# Patient Record
Sex: FEMALE | Race: WHITE | NOT HISPANIC OR LATINO | Employment: FULL TIME | URBAN - METROPOLITAN AREA
[De-identification: names, ages, dates, MRNs, and addresses within clinical notes are randomized per-mention and may not be internally consistent; named-entity substitution may affect disease eponyms.]

---

## 2017-03-28 ENCOUNTER — ALLSCRIPTS OFFICE VISIT (OUTPATIENT)
Dept: OTHER | Facility: OTHER | Age: 27
End: 2017-03-28

## 2017-03-28 ENCOUNTER — LAB REQUISITION (OUTPATIENT)
Dept: LAB | Facility: HOSPITAL | Age: 27
End: 2017-03-28
Payer: COMMERCIAL

## 2017-03-28 DIAGNOSIS — R10.2 PELVIC AND PERINEAL PAIN: ICD-10-CM

## 2017-03-28 DIAGNOSIS — Z01.419 ENCOUNTER FOR GYNECOLOGICAL EXAMINATION WITHOUT ABNORMAL FINDING: ICD-10-CM

## 2017-03-28 PROCEDURE — G0145 SCR C/V CYTO,THINLAYER,RESCR: HCPCS | Performed by: NURSE PRACTITIONER

## 2017-04-03 ENCOUNTER — HOSPITAL ENCOUNTER (OUTPATIENT)
Dept: ULTRASOUND IMAGING | Facility: HOSPITAL | Age: 27
Discharge: HOME | End: 2017-04-03
Payer: COMMERCIAL

## 2017-04-03 DIAGNOSIS — R10.2 PELVIC AND PERINEAL PAIN: ICD-10-CM

## 2017-04-03 LAB
LAB AP GYN PRIMARY INTERPRETATION: NORMAL
LAB AP LMP: NORMAL
Lab: NORMAL

## 2017-04-03 PROCEDURE — 76856 US EXAM PELVIC COMPLETE: CPT

## 2017-04-03 PROCEDURE — 76830 TRANSVAGINAL US NON-OB: CPT

## 2017-04-12 ENCOUNTER — GENERIC CONVERSION - ENCOUNTER (OUTPATIENT)
Dept: OTHER | Facility: OTHER | Age: 27
End: 2017-04-12

## 2017-08-24 ENCOUNTER — TRANSCRIBE ORDERS (OUTPATIENT)
Dept: ADMINISTRATIVE | Facility: HOSPITAL | Age: 27
End: 2017-08-24

## 2017-08-24 DIAGNOSIS — J32.9 UNSPECIFIED SINUSITIS (CHRONIC): Primary | ICD-10-CM

## 2017-08-30 ENCOUNTER — HOSPITAL ENCOUNTER (OUTPATIENT)
Dept: CT IMAGING | Facility: HOSPITAL | Age: 27
Discharge: HOME | End: 2017-08-30
Payer: COMMERCIAL

## 2017-08-30 DIAGNOSIS — J32.9 UNSPECIFIED SINUSITIS (CHRONIC): ICD-10-CM

## 2017-08-30 PROCEDURE — 70486 CT MAXILLOFACIAL W/O DYE: CPT

## 2018-01-13 VITALS
DIASTOLIC BLOOD PRESSURE: 78 MMHG | BODY MASS INDEX: 34.45 KG/M2 | WEIGHT: 214.38 LBS | HEIGHT: 66 IN | SYSTOLIC BLOOD PRESSURE: 140 MMHG

## 2018-01-15 NOTE — RESULT NOTES
Verified Results  * US PELVIS COMPLETE (TRANSABDOMINAL AND TRANSVAGINAL) [id number] 22:82OV Rakel Trivedi Order Number: [id number]    - Patient Instructions: To schedule this appointment, please contact Central Scheduling at [phone]  Test Name Result Flag Reference   US PELVIS COMPLETE (TRANSABDOMINAL AND TRANSVAGINAL) (Report)     PELVIC ULTRASOUND, COMPLETE     INDICATION: Pelvic pain          COMPARISON: None  TECHNIQUE:  Transabdominal pelvic ultrasound was performed in sagittal and transverse planes with a curvilinear transducer  Additional transvaginal imaging was performed to better evaluate the endometrium and ovaries  Imaging included volumetric    sweeps as well as traditional still imaging technique  FINDINGS:     UTERUS:   The uterus is anteverted in position, measuring 7 8 x 3 4 x 5 4 cm  Contour and echotexture appear normal      The cervix shows no suspicious abnormality  ENDOMETRIUM:    Normal caliber of 5 4 mm  Homogenous and normal in appearance  OVARIES/ADNEXA:   Right ovary: 3 4 x 2 2 x 2 3 cm  No suspicious right ovarian abnormality  Doppler flow within normal limits  Left ovary: Not visualized  No suspicious adnexal mass or loculated collections     Small amount of free fluid in the posterior cul-de-sac is seen       IMPRESSION:      No pelvic adnexal mass seen   Normal size right ovary   The left ovary is not visualized however there is no mass seen within the left adnexa          Workstation performed: [id number]     Signed by:   Harshal Esparza MD   4/3/17     (1) THIN PREP PAP WITH IMAGING [id number] 23:23CH Rakel Barriosleandro Order Number: [id number]     Test Name Result Flag Reference   LAB AP CASE REPORT (Report)     Gynecologic Cytology Report            Case: [id number]                  Authorizing Provider: SHELDON Moseley    Collected:      03/28/2017 1604        First Screen:     ANDRY Huang   Received: 03/30/2017 1549        Specimen:  LIQUID-BASED PAP, SCREENING, Cervix   LAB AP GYN PRIMARY INTERPRETATION      Negative for intraepithelial lesion or malignancy  Electronically signed by ANDRY Taylor on 4/3/2017 at 3:53 PM   LAB AP GYN SPECIMEN ADEQUACY      Satisfactory for evaluation  Endocervical/transformation zone component present  LAB AP GYN ADDITIONAL INFORMATION (Report)     [employer]'s FDA approved ,  and ThinPrep Imaging System are   utilized with strict adherence to the 's instruction manual to   prepare gynecologic and non-gynecologic cytology specimens for the   production of ThinPrep slides as well as for gynecologic ThinPrep imaging  These processes have been validated by our laboratory and/or by the     The Pap test is not a diagnostic procedure and should not be used as the   sole means to detect cervical cancer  It is only a screening procedure to   aid in the detection of cervical cancer and its precursors  Both   false-negative and false-positive results have been experienced  Your   patient's test result should be interpreted in this context together with   the history and clinical findings     LAB AP LMP 3/8/2017     Performing Comments: CERVICAL - ENDOCERVICAL  ROUTINE

## 2018-02-22 RX ORDER — EPINEPHRINE 0.3 MG/.3ML
0.3 INJECTION SUBCUTANEOUS ONCE
COMMUNITY

## 2018-02-27 ENCOUNTER — ANESTHESIA EVENT (OUTPATIENT)
Dept: PERIOP | Facility: HOSPITAL | Age: 28
End: 2018-02-27
Payer: COMMERCIAL

## 2018-02-28 ENCOUNTER — HOSPITAL ENCOUNTER (OUTPATIENT)
Facility: HOSPITAL | Age: 28
Setting detail: OUTPATIENT SURGERY
Discharge: HOME | End: 2018-02-28
Attending: OTOLARYNGOLOGY | Admitting: OTOLARYNGOLOGY
Payer: COMMERCIAL

## 2018-02-28 ENCOUNTER — ANESTHESIA (OUTPATIENT)
Dept: PERIOP | Facility: HOSPITAL | Age: 28
End: 2018-02-28
Payer: COMMERCIAL

## 2018-02-28 VITALS
OXYGEN SATURATION: 97 % | DIASTOLIC BLOOD PRESSURE: 88 MMHG | TEMPERATURE: 99.6 F | RESPIRATION RATE: 18 BRPM | HEART RATE: 89 BPM | SYSTOLIC BLOOD PRESSURE: 139 MMHG

## 2018-02-28 DIAGNOSIS — J32.9 CHRONIC SINUSITIS: ICD-10-CM

## 2018-02-28 DIAGNOSIS — J34.3 HYPERTROPHY OF NASAL TURBINATES: ICD-10-CM

## 2018-02-28 DIAGNOSIS — J34.2 DEVIATED NASAL SEPTUM: ICD-10-CM

## 2018-02-28 LAB — EXT PREGNANCY TEST URINE: NEGATIVE

## 2018-02-28 PROCEDURE — 88307 TISSUE EXAM BY PATHOLOGIST: CPT | Performed by: OTOLARYNGOLOGY

## 2018-02-28 PROCEDURE — 88304 TISSUE EXAM BY PATHOLOGIST: CPT | Performed by: PATHOLOGY

## 2018-02-28 PROCEDURE — 88304 TISSUE EXAM BY PATHOLOGIST: CPT | Performed by: OTOLARYNGOLOGY

## 2018-02-28 PROCEDURE — 88311 DECALCIFY TISSUE: CPT | Performed by: OTOLARYNGOLOGY

## 2018-02-28 PROCEDURE — 88311 DECALCIFY TISSUE: CPT | Performed by: PATHOLOGY

## 2018-02-28 PROCEDURE — 81025 URINE PREGNANCY TEST: CPT | Performed by: ANESTHESIOLOGY

## 2018-02-28 PROCEDURE — 88307 TISSUE EXAM BY PATHOLOGIST: CPT | Performed by: PATHOLOGY

## 2018-02-28 RX ORDER — OXYCODONE HYDROCHLORIDE AND ACETAMINOPHEN 5; 325 MG/1; MG/1
2 TABLET ORAL EVERY 4 HOURS PRN
Status: DISCONTINUED | OUTPATIENT
Start: 2018-02-28 | End: 2018-02-28 | Stop reason: HOSPADM

## 2018-02-28 RX ORDER — PROPOFOL 10 MG/ML
INJECTION, EMULSION INTRAVENOUS CONTINUOUS PRN
Status: DISCONTINUED | OUTPATIENT
Start: 2018-02-28 | End: 2018-02-28 | Stop reason: SURG

## 2018-02-28 RX ORDER — SODIUM CHLORIDE 9 MG/ML
125 INJECTION, SOLUTION INTRAVENOUS CONTINUOUS
Status: DISCONTINUED | OUTPATIENT
Start: 2018-02-28 | End: 2018-02-28 | Stop reason: HOSPADM

## 2018-02-28 RX ORDER — LIDOCAINE HYDROCHLORIDE AND EPINEPHRINE 10; 10 MG/ML; UG/ML
INJECTION, SOLUTION INFILTRATION; PERINEURAL AS NEEDED
Status: DISCONTINUED | OUTPATIENT
Start: 2018-02-28 | End: 2018-02-28 | Stop reason: HOSPADM

## 2018-02-28 RX ORDER — FENTANYL CITRATE/PF 50 MCG/ML
25 SYRINGE (ML) INJECTION
Status: DISCONTINUED | OUTPATIENT
Start: 2018-02-28 | End: 2018-02-28 | Stop reason: HOSPADM

## 2018-02-28 RX ORDER — ONDANSETRON 2 MG/ML
4 INJECTION INTRAMUSCULAR; INTRAVENOUS ONCE
Status: DISCONTINUED | OUTPATIENT
Start: 2018-02-28 | End: 2018-02-28 | Stop reason: HOSPADM

## 2018-02-28 RX ORDER — MAGNESIUM HYDROXIDE 1200 MG/15ML
LIQUID ORAL AS NEEDED
Status: DISCONTINUED | OUTPATIENT
Start: 2018-02-28 | End: 2018-02-28 | Stop reason: HOSPADM

## 2018-02-28 RX ORDER — ROCURONIUM BROMIDE 10 MG/ML
INJECTION, SOLUTION INTRAVENOUS AS NEEDED
Status: DISCONTINUED | OUTPATIENT
Start: 2018-02-28 | End: 2018-02-28 | Stop reason: SURG

## 2018-02-28 RX ORDER — DEXAMETHASONE SODIUM PHOSPHATE 4 MG/ML
INJECTION, SOLUTION INTRA-ARTICULAR; INTRALESIONAL; INTRAMUSCULAR; INTRAVENOUS; SOFT TISSUE AS NEEDED
Status: DISCONTINUED | OUTPATIENT
Start: 2018-02-28 | End: 2018-02-28 | Stop reason: SURG

## 2018-02-28 RX ORDER — FENTANYL CITRATE 50 UG/ML
INJECTION, SOLUTION INTRAMUSCULAR; INTRAVENOUS AS NEEDED
Status: DISCONTINUED | OUTPATIENT
Start: 2018-02-28 | End: 2018-02-28 | Stop reason: SURG

## 2018-02-28 RX ORDER — PROPOFOL 10 MG/ML
INJECTION, EMULSION INTRAVENOUS AS NEEDED
Status: DISCONTINUED | OUTPATIENT
Start: 2018-02-28 | End: 2018-02-28 | Stop reason: SURG

## 2018-02-28 RX ORDER — ONDANSETRON 2 MG/ML
4 INJECTION INTRAMUSCULAR; INTRAVENOUS EVERY 6 HOURS PRN
Status: DISCONTINUED | OUTPATIENT
Start: 2018-02-28 | End: 2018-02-28 | Stop reason: HOSPADM

## 2018-02-28 RX ORDER — MIDAZOLAM HYDROCHLORIDE 1 MG/ML
INJECTION INTRAMUSCULAR; INTRAVENOUS AS NEEDED
Status: DISCONTINUED | OUTPATIENT
Start: 2018-02-28 | End: 2018-02-28 | Stop reason: SURG

## 2018-02-28 RX ORDER — GINSENG 100 MG
CAPSULE ORAL AS NEEDED
Status: DISCONTINUED | OUTPATIENT
Start: 2018-02-28 | End: 2018-02-28 | Stop reason: HOSPADM

## 2018-02-28 RX ORDER — ONDANSETRON 2 MG/ML
INJECTION INTRAMUSCULAR; INTRAVENOUS AS NEEDED
Status: DISCONTINUED | OUTPATIENT
Start: 2018-02-28 | End: 2018-02-28 | Stop reason: SURG

## 2018-02-28 RX ORDER — DEXTROSE MONOHYDRATE AND SODIUM CHLORIDE 5; .45 G/100ML; G/100ML
125 INJECTION, SOLUTION INTRAVENOUS CONTINUOUS
Status: DISCONTINUED | OUTPATIENT
Start: 2018-02-28 | End: 2018-02-28 | Stop reason: HOSPADM

## 2018-02-28 RX ORDER — MEPERIDINE HYDROCHLORIDE 50 MG/ML
12.5 INJECTION INTRAMUSCULAR; INTRAVENOUS; SUBCUTANEOUS
Status: DISCONTINUED | OUTPATIENT
Start: 2018-02-28 | End: 2018-02-28 | Stop reason: HOSPADM

## 2018-02-28 RX ORDER — GLYCOPYRROLATE 0.2 MG/ML
INJECTION INTRAMUSCULAR; INTRAVENOUS AS NEEDED
Status: DISCONTINUED | OUTPATIENT
Start: 2018-02-28 | End: 2018-02-28 | Stop reason: SURG

## 2018-02-28 RX ADMIN — NEOSTIGMINE METHYLSULFATE 3 MG: 1 INJECTION, SOLUTION INTRAMUSCULAR; INTRAVENOUS; SUBCUTANEOUS at 10:55

## 2018-02-28 RX ADMIN — MIDAZOLAM HYDROCHLORIDE 2 MG: 1 INJECTION, SOLUTION INTRAMUSCULAR; INTRAVENOUS at 09:38

## 2018-02-28 RX ADMIN — FENTANYL CITRATE 100 MCG: 50 INJECTION, SOLUTION INTRAMUSCULAR; INTRAVENOUS at 09:46

## 2018-02-28 RX ADMIN — SODIUM CHLORIDE: 0.9 INJECTION, SOLUTION INTRAVENOUS at 11:00

## 2018-02-28 RX ADMIN — GLYCOPYRROLATE 0.5 MG: 0.2 INJECTION, SOLUTION INTRAMUSCULAR; INTRAVENOUS at 10:55

## 2018-02-28 RX ADMIN — PROPOFOL 140 MCG/KG/MIN: 10 INJECTION, EMULSION INTRAVENOUS at 09:46

## 2018-02-28 RX ADMIN — PROPOFOL 200 MG: 10 INJECTION, EMULSION INTRAVENOUS at 09:46

## 2018-02-28 RX ADMIN — ROCURONIUM BROMIDE 35 MG: 10 INJECTION INTRAVENOUS at 09:46

## 2018-02-28 RX ADMIN — DEXAMETHASONE SODIUM PHOSPHATE 8 MG: 4 INJECTION, SOLUTION INTRAMUSCULAR; INTRAVENOUS at 10:13

## 2018-02-28 RX ADMIN — Medication 0.15 MCG/KG/MIN: at 09:46

## 2018-02-28 RX ADMIN — SODIUM CHLORIDE 125 ML/HR: 0.9 INJECTION, SOLUTION INTRAVENOUS at 07:45

## 2018-02-28 RX ADMIN — SODIUM CHLORIDE: 0.9 INJECTION, SOLUTION INTRAVENOUS at 10:17

## 2018-02-28 RX ADMIN — ONDANSETRON HYDROCHLORIDE 4 MG: 2 INJECTION, SOLUTION INTRAVENOUS at 10:15

## 2018-02-28 RX ADMIN — LIDOCAINE HYDROCHLORIDE 100 MG: 20 INJECTION, SOLUTION INTRAVENOUS at 09:46

## 2018-02-28 RX ADMIN — OXYCODONE HYDROCHLORIDE AND ACETAMINOPHEN 1 TABLET: 5; 325 TABLET ORAL at 12:42

## 2018-02-28 NOTE — DISCHARGE INSTRUCTIONS
[facility]    POST OPERATIVE SINUS SURGERY INSTRUCTIONS      1  Change drip pad under her nose as needed  2  Keep head of bed elevated  Sleep on at least a few pillows at night  3  Expect and do not become concerned about not being able to breathe through your nose  You will be a mouth-breather for approximately one week  4  You may cleanse crusting from the anterior portion of your nose with hydrogen peroxide and Q-tips  You may use Ocean nasal spray throughout the day to prevent crusting inside nasal cavity and splints  5  Begin using "sinus rinse " 2 times daily  If you have nasal splints in place you may start after the nasal splints are removed  6  Do not blow your nose until exactly 1 week after surgery  7  If you must sneeze, sneeze with mouth open  8  Avoid any strenuous activity, exercise, bending, or lifting, until approved by your surgeon  9  If there is significant bleeding, you may use over-the-counter Afrin  Place 2 sprays into the bleeding nostril every 5 minutes up to 3 consecutive times  If bleeding does not stop, call our office at [phone] or [phone] or go directly to the emergency room  10  If you have severe pain which is uncontrolled by medication or a fever greater than 101°F, notify our office immediately at [phone] or [phone]

## 2018-02-28 NOTE — OP NOTE
OPERATIVE REPORT  PATIENT NAME: Mgiuel Jimenez    :  1990  MRN: [id number]  Pt Location: [facility]OM 02    SURGERY DATE: 2018    Surgeon(s) and Role:     DO Myla Garcia Primary    Preop Diagnosis:  Chronic sinusitis [J32 9]  Hypertrophy of nasal turbinates [J34 3]  Deviated nasal septum [J34 2]    Post-Op Diagnosis Codes:     * Chronic sinusitis [J32 9]     * Hypertrophy of nasal turbinates [J34 3]     * Deviated nasal septum [J34 2]    Procedure(s) (LRB):  FUNCTIONAL ENDOSCOPIC SINUS SURGERY (FESS) IMAGED GUIDED (N/A)  PARTIAL INFERIOR TURBINATE REDUCTION (N/A)  SEPTOPLASTY WITH REMOVAL OF RIGHT FLOOR SPUR (N/A)    Specimen(s):  ID Type Source Tests Collected by Time Destination   1 : left Tissue Nasal/Sinus Polyps TISSUE EXAM Krupa Ramirez,  2018 1015    2 : right Tissue Nasal/Sinus Polyps TISSUE EXAM Krupa Ramirez,  2018 1015    3 :  Tissue Nasal Septum TISSUE EXAM Krupa Ramirez,  2018 1015        Estimated Blood Loss:   Minimal    Drains:       Anesthesia Type:   General    Operative Indications:  Chronic sinusitis [J32 9]  Hypertrophy of nasal turbinates [J34 3]  Deviated nasal septum [J34 2]      Operative Findings:  DNS/Turbinate hypertrophy    Complications:   None    Procedure and Technique:  After the patient was properly identified she was placed on the operating table in the usual supine position  Informed time-out was completed noting the patient's date of birth, pregnancy test was negative; the procedure was stated by the operative surgeon confirmed by Nursing and anesthesia with no contraindications to proceeding with surgery  General endotracheal anesthesia was initiated when deemed adequate the patient was rotated 90° to her left  The image guided head gear was attached to the patient's forehead and calibrated noted to be functioning well  The patient was prepped and draped in the usual sterile fashion   Cocaine on cotton pledgets was placed in the left and right nasal vault (4% cocaine solution) and let well for 5 minutes  Pledgets were removed in the inferior and middle turbinates as well as lateral nasal walls were injected with 10 mL of 1% lidocaine 1 to 478484 epinephrine  The same solution was injected into the left and right nasal septum for a total of 4 mL  A behzad-transfixion incision was made and mucoperichondrial flaps were raised left and right side  Deviated septum to the right which impacted the right middle turbinate as well as the floor spur on the right were carefully resected  Approximately 2 cm of columellar cartilage was left intact with a vertical incision created with a D knife in the quadrangular cartilage  The cartilage and bony deflection to the right was carefully removed with Radha Sandoval forceps  The floor spur on the right was carefully removed using a D knife as well as through cut forceps  Mucoperichondrial flaps were noted to be intact  Using image guided and 0/30 degree Albright alexa endoscopy and uncinectomy was performed on the left  The antrostomy was created using backbiting forceps as well as the microdebrider  Left middle turbinate was hypertrophic and impinged on the lateral nasal wall  Using through cut forceps as well as the microdebrider the anterior portion of the middle turbinate was carefully resected  We then turned our attention to the right side and using 0 degree endoscopy as well as 30 degree Albright alexa endoscopy right maxillary antrostomy was created after uncinectomy  The middle turbinate was hypertrophic and lateralized; the middle turbinate was reduced in size in the same fashion as on the left  The turbinate her Wand was used to perform submucosal resection of the inferior turbinates  The inferior turbinates were to be lateralized after outfracturing was performed in the office    The inferior and medial portion of the turbinates still were redundant and these were reduced using the turbinate her 1st on the left, then on the right  Significant reduction in size of inferior and middle turbinates was noted using the 0 degree Albright alexa endoscope  The behzad-transfixion incision was closed using 4-0 chromic simple interrupted sutures  The nasopharynx was suctioned and again endoscopy revealed no active bleeding, bilaterally  Alicia splints coated in bacitracin were placed in the left and right nasal vault and secured through the columella with and 2-0 nylon whipstitch  The image guided head gear was disconnected  The oral cavity was suctioned and no blood was noted  The patient was rotated back to Anesthesia  General endotracheal anesthesia was discontinued  The patient was awakened without difficulty returned to the recovery room      I was present for the entire procedure    Patient Disposition:  PACU     SIGNATURE: Evelin Culp DO  DATE: February 28, 2018  TIME: 11:09 AM

## 2018-02-28 NOTE — ANESTHESIA PREPROCEDURE EVALUATION
Review of Systems/Medical History  Patient summary reviewed  Chart reviewed  No history of anesthetic complications     Cardiovascular  Negative cardio ROS    Pulmonary  Negative pulmonary ROS        GI/Hepatic  Negative GI/hepatic ROS          Negative  ROS        Endo/Other    Obesity    GYN  Negative gynecology ROS          Hematology  Negative hematology ROS   Lymphoma   Musculoskeletal       Neurology  Seizures well controlled,     Psychology   Negative psychology ROS              Physical Exam    Airway    Mallampati score: II  TM Distance: >3 FB  Neck ROM: full     Dental   No notable dental hx     Cardiovascular  Comment: Negative ROS, Rhythm: regular, Rate: normal, Cardiovascular exam normal    Pulmonary  Pulmonary exam normal Breath sounds clear to auscultation,     Other Findings        Anesthesia Plan  ASA Score- 2     Anesthesia Type- general with ASA Monitors  Additional Monitors:   Airway Plan:         Plan Factors-Patient not instructed to abstain from smoking on day of procedure       Induction- intravenous  Postoperative Plan- Plan for postoperative opioid use  Informed Consent- Anesthetic plan and risks discussed with patient

## 2018-02-28 NOTE — ANESTHESIA POSTPROCEDURE EVALUATION
Post-Op Assessment Note      CV Status:  Stable    Mental Status:  Alert and awake    Hydration Status:  Euvolemic    PONV Controlled:  Controlled    Airway Patency:  Patent    Post Op Vitals Reviewed:  Yes              /88 (02/28/18 1243)    Temp      Pulse 89 (02/28/18 1243)   Resp 18 (02/28/18 1243)    SpO2 97 % (02/28/18 1243)

## 2018-03-26 ENCOUNTER — HOSPITAL ENCOUNTER (OUTPATIENT)
Dept: RADIOLOGY | Facility: HOSPITAL | Age: 28
Discharge: HOME | End: 2018-03-26
Admitting: FAMILY MEDICINE
Payer: COMMERCIAL

## 2018-03-26 ENCOUNTER — OFFICE VISIT (OUTPATIENT)
Dept: URGENT CARE | Facility: CLINIC | Age: 28
End: 2018-03-26
Payer: COMMERCIAL

## 2018-03-26 ENCOUNTER — APPOINTMENT (OUTPATIENT)
Dept: URGENT CARE | Facility: CLINIC | Age: 28
End: 2018-03-26
Payer: COMMERCIAL

## 2018-03-26 ENCOUNTER — APPOINTMENT (OUTPATIENT)
Dept: RADIOLOGY | Facility: CLINIC | Age: 28
End: 2018-03-26
Payer: COMMERCIAL

## 2018-03-26 VITALS
TEMPERATURE: 97.3 F | DIASTOLIC BLOOD PRESSURE: 83 MMHG | OXYGEN SATURATION: 98 % | RESPIRATION RATE: 18 BRPM | SYSTOLIC BLOOD PRESSURE: 137 MMHG | HEART RATE: 103 BPM

## 2018-03-26 DIAGNOSIS — S93.401A SPRAIN OF RIGHT ANKLE, UNSPECIFIED LIGAMENT, INITIAL ENCOUNTER: Primary | ICD-10-CM

## 2018-03-26 DIAGNOSIS — M25.571 ACUTE RIGHT ANKLE PAIN: ICD-10-CM

## 2018-03-26 PROCEDURE — 73610 X-RAY EXAM OF ANKLE: CPT

## 2018-03-26 PROCEDURE — 99203 OFFICE O/P NEW LOW 30 MIN: CPT

## 2018-03-26 NOTE — PROGRESS NOTES
[address] [facility]        NAME: Edgar White is a 32 y o  female  : 1990    MRN: [id number]  DATE: 2018  TIME: 11:32 AM    Assessment and Plan   Sprain of right ankle, unspecified ligament, initial encounter [S93 401A]  1  Sprain of right ankle, unspecified ligament, initial encounter     2  Acute right ankle pain  XR ankle 3+ vw right         Patient Instructions     Patient Instructions   Ace wrap applied here for comfort  Ice/heat as directed  Ibuprofen/aleve otc for pain  Consult ortho or pcp if pain continues  Call or return for problems/concerns    Follow up with PCP in 3-5 days  Proceed to  ER if symptoms worsen  Chief Complaint     Chief Complaint   Patient presents with    Ankle Pain     Pt c/o right ankle pain  No injury         History of Present Illness       Right ankle pain- woke up at 3am with throbbing pain in right ankle  Noticed some bruising and has pain with movement  No known injury      Ankle Pain          Review of Systems   Review of Systems   Constitutional: Negative for activity change, diaphoresis, fatigue and fever  HENT: Negative for congestion, facial swelling, hearing loss, rhinorrhea, sinus pain, sinus pressure, sneezing, sore throat and voice change  Eyes: Negative for discharge and visual disturbance  Respiratory: Negative for cough, choking, chest tightness, shortness of breath, wheezing and stridor  Cardiovascular: Negative for chest pain, palpitations and leg swelling  Gastrointestinal: Negative for abdominal distention, abdominal pain, constipation, diarrhea, nausea and vomiting  Endocrine: Negative for polydipsia, polyphagia and polyuria  Genitourinary: Negative for difficulty urinating, dysuria, frequency and urgency  Musculoskeletal: Positive for arthralgias and gait problem  Negative for back pain, joint swelling, myalgias, neck pain and neck stiffness  Skin: Negative for color change, rash and wound     Neurological: Negative for dizziness, syncope, speech difficulty, weakness, light-headedness and headaches  Hematological: Negative for adenopathy  Does not bruise/bleed easily  Psychiatric/Behavioral: Negative for agitation, behavioral problems, confusion, hallucinations, sleep disturbance and suicidal ideas  The patient is not nervous/anxious  Current Medications       Current Outpatient Prescriptions:     EPINEPHrine (EPIPEN) 0 3 mg/0 3 mL SOAJ, Inject 0 3 mg into the shoulder, thigh, or buttocks once, Disp: , Rfl:     UNKNOWN TO PATIENT, A birth control daily in the evening,,, pt to let bring name of it day of surgery, Disp: , Rfl:     Current Allergies     Allergies as of 03/26/2018 - Reviewed 03/26/2018   Allergen Reaction Noted    Shellfish-derived products Shortness Of Breath 02/22/2018            The following portions of the patient's history were reviewed and updated as appropriate: allergies, current medications, past family history, past medical history, past social history, past surgical history and problem list      Past Medical History:   Diagnosis Date    Deviated septum     Eczema     Recurrent sinus infections     Seizures ([address] )     febrile as a child "grew out of them"    Wears glasses        Past Surgical History:   Procedure Laterality Date    APPENDECTOMY      LYMPH NODE DISSECTION      "in neck completely removed age 11"   [facility]SE [facility]  2017    OR NASAL/SINUS [address] W/TOTAL ETHOIDECTOMY N/A 2/28/2018    Procedure: PARTIAL INFERIOR TURBINATE REDUCTION;  Surgeon: Faby Thomas DO;  Location: [facility];  Service: ENT    OR REPAIR OF NASAL SEPTUM N/A 2/28/2018    Procedure: SEPTOPLASTY WITH REMOVAL OF RIGHT FLOOR SPUR;  Surgeon: Faby Thomas DO;  Location: [facility];  Service: ENT    OR STEREOTACTIC COMP ASSIST PROC,CRANIAL,EXTRADURAL N/A 2/28/2018    Procedure: FUNCTIONAL ENDOSCOPIC SINUS SURGERY (FESS) IMAGED GUIDED; Surgeon:  Faby Thomas DO;  Location[facility];  Service: ENT No family history on file  Medications have been verified  Objective   /83   Pulse 103   Temp (!) 97 3 °F (36 3 °C)   Resp 18   SpO2 98%        Physical Exam     Physical Exam   Constitutional: She is oriented to person, place, and time  Vital signs are normal  She appears well-developed and well-nourished  She is active and cooperative  No distress  Eyes: EOM are normal    Cardiovascular: Normal rate, regular rhythm and normal heart sounds  No murmur heard  Pulmonary/Chest: Effort normal and breath sounds normal  No respiratory distress  She has no wheezes  Musculoskeletal: She exhibits tenderness (right lateral ankle, no swelling, no ecchymosis seen, pedal pulse bounding, cap refill of all right toes <2  seconds, tender with ROM)  She exhibits no edema or deformity  Neurological: She is alert and oriented to person, place, and time  Skin: Skin is warm and dry  No rash noted  She is not diaphoretic  No erythema  Psychiatric: She has a normal mood and affect  Her behavior is normal  Judgment and thought content normal    Nursing note and vitals reviewed

## 2018-03-26 NOTE — PATIENT INSTRUCTIONS
Ace wrap applied here for comfort  Ice/heat as directed  Ibuprofen/aleve otc for pain  Consult ortho or pcp if pain continues  Call or return for problems/concerns

## 2018-04-03 ENCOUNTER — ANNUAL EXAM (OUTPATIENT)
Dept: GYNECOLOGY | Facility: CLINIC | Age: 28
End: 2018-04-03
Payer: COMMERCIAL

## 2018-04-03 VITALS — SYSTOLIC BLOOD PRESSURE: 120 MMHG | BODY MASS INDEX: 34.35 KG/M2 | WEIGHT: 209.6 LBS | DIASTOLIC BLOOD PRESSURE: 92 MMHG

## 2018-04-03 DIAGNOSIS — Z30.41 ENCOUNTER FOR SURVEILLANCE OF CONTRACEPTIVE PILLS: Primary | ICD-10-CM

## 2018-04-03 PROCEDURE — 99213 OFFICE O/P EST LOW 20 MIN: CPT | Performed by: NURSE PRACTITIONER

## 2018-04-03 RX ORDER — NORGESTIMATE AND ETHINYL ESTRADIOL 7DAYSX3 28
1 KIT ORAL DAILY
Qty: 90 TABLET | Refills: 3 | Status: SHIPPED | OUTPATIENT
Start: 2018-04-03 | End: 2019-03-14 | Stop reason: SDUPTHER

## 2018-04-03 NOTE — PROGRESS NOTES
Assessment/Plan:    Contraceptive management     Diagnoses and all orders for this visit:    Encounter for surveillance of contraceptive pills  -     norgestimate-ethinyl estradiol (ORTHO TRI-CYCLEN,TRINESSA) 0 18/0 215/0 25 MG-35 MCG per tablet; Take 1 tablet by mouth daily        Subjective: The pt   presents today for a pill check visit only     Patient ID: Suyapa Gonzalez is a 32 y o  female  HPI  The pt  was scheduled for her yearly GYN exam today  However she declines an exam today and states that she prefers to only be seen for a pill check visit  She continues to use OC's without problems and prefers to remain on the same  Her periods are regular and last about 5 days  Usually she will have one day or cramping pre period  She denies being sexually active for at least 7 months  She states that she will occassionally have a random HA which is relieved with the use of Tylenol  The following portions of the patient's history were reviewed and updated as appropriate: allergies, current medications, past family history, past medical history, past social history, past surgical history and problem list     Review of Systems   Constitutional: Negative  Respiratory: Negative for cough and shortness of breath  Cardiovascular: Negative for chest pain and palpitations  Gastrointestinal: Negative for constipation, diarrhea and nausea  Genitourinary: Negative for menstrual problem, pelvic pain, vaginal bleeding and vaginal discharge  Neurological: Positive for headaches  Negative for numbness  Psychiatric/Behavioral: Negative  Objective:      /92 (BP Location: Left arm, Patient Position: Sitting)   Wt 95 1 kg (209 lb 9 6 oz)   BMI 34 35 kg/m²          Physical Exam   Constitutional: She is oriented to person, place, and time  She appears well-developed and well-nourished  Neurological: She is alert and oriented to person, place, and time     Psychiatric: She has a normal mood and affect  Her behavior is normal      GYN exam deferred today -- Pt  refused yearly exam today    PLAN: We reviewed the use/risks/benefits of OC use  Since she was adamant about not having her GYN exam we only completed a pill check visit  She will continue with OC use as directed  She agrees to RTO in 1 yr  For a GYN exam and pap smear  The entire visit lasted 15 minutes and the whole visit was spent in face to face consultation

## 2018-11-08 ENCOUNTER — OFFICE VISIT (OUTPATIENT)
Dept: GYNECOLOGY | Facility: CLINIC | Age: 28
End: 2018-11-08
Payer: COMMERCIAL

## 2018-11-08 VITALS
BODY MASS INDEX: 33.07 KG/M2 | HEIGHT: 66 IN | SYSTOLIC BLOOD PRESSURE: 130 MMHG | DIASTOLIC BLOOD PRESSURE: 86 MMHG | WEIGHT: 205.8 LBS

## 2018-11-08 DIAGNOSIS — N60.01 BILATERAL BREAST CYSTS: Primary | ICD-10-CM

## 2018-11-08 DIAGNOSIS — N60.02 BILATERAL BREAST CYSTS: Primary | ICD-10-CM

## 2018-11-08 PROCEDURE — 99213 OFFICE O/P EST LOW 20 MIN: CPT | Performed by: NURSE PRACTITIONER

## 2018-11-08 NOTE — PROGRESS NOTES
Assessment/Plan:    Bilateral breast cysts     Diagnoses and all orders for this visit:    Bilateral breast cysts          Subjective: The pt; presents c/o "cysts" in both breasts     Patient ID: Delma Osman is a 32 y o  female  HPI The pt  presents to day c/o feeling some cysts in both breast about 1 week ago  She states that she has minimal breast tenderness  She is using OCP's and relates that her period is due in about 1 week  There is no FH of breast cancer  The following portions of the patient's history were reviewed and updated as appropriate: current medications, past family history, past medical history and past social history  Review of Systems   Constitutional: Negative  Genitourinary: Negative  Reports bilateral breast cysts   Psychiatric/Behavioral: Negative  Objective:      /86 (BP Location: Right arm)   Ht 5' 5 5" (1 664 m)   Wt 93 4 kg (205 lb 12 8 oz)   LMP 10/24/2018 (Approximate)   BMI 33 73 kg/m²          Physical Exam   Constitutional: She is oriented to person, place, and time  She appears well-developed and well-nourished  Pulmonary/Chest: Right breast exhibits no inverted nipple, no mass, no nipple discharge and no skin change  Left breast exhibits no inverted nipple, no mass, no nipple discharge, no skin change and no tenderness  Breasts are symmetrical    Neurological: She is alert and oriented to person, place, and time  Psychiatric: She has a normal mood and affect  Her behavior is normal        PLAN: We discussed her sx  and today's normal findings  We also reviewed SBE and best time to do this  She is aware that breast tissue can feel different prior to the menses  The pt  was reassured re: normal findings today

## 2019-01-02 ENCOUNTER — OFFICE VISIT (OUTPATIENT)
Dept: URGENT CARE | Facility: CLINIC | Age: 29
End: 2019-01-02
Payer: COMMERCIAL

## 2019-01-02 VITALS
HEIGHT: 65 IN | OXYGEN SATURATION: 100 % | HEART RATE: 98 BPM | RESPIRATION RATE: 18 BRPM | BODY MASS INDEX: 34.16 KG/M2 | TEMPERATURE: 98.7 F | DIASTOLIC BLOOD PRESSURE: 59 MMHG | WEIGHT: 205 LBS | SYSTOLIC BLOOD PRESSURE: 106 MMHG

## 2019-01-02 DIAGNOSIS — J01.10 ACUTE FRONTAL SINUSITIS, RECURRENCE NOT SPECIFIED: Primary | ICD-10-CM

## 2019-01-02 PROCEDURE — 99213 OFFICE O/P EST LOW 20 MIN: CPT | Performed by: PHYSICIAN ASSISTANT

## 2019-01-02 RX ORDER — AZITHROMYCIN 250 MG/1
TABLET, FILM COATED ORAL
Qty: 6 TABLET | Refills: 0 | Status: SHIPPED | OUTPATIENT
Start: 2019-01-02 | End: 2019-01-06

## 2019-01-02 RX ORDER — METHYLPREDNISOLONE 4 MG/1
TABLET ORAL
Qty: 21 TABLET | Refills: 0 | Status: SHIPPED | OUTPATIENT
Start: 2019-01-02 | End: 2019-10-13 | Stop reason: ALTCHOICE

## 2019-01-02 NOTE — PROGRESS NOTES
[facility]        NAME: Lizzy Anderson is a 29 y o  female  : 1990    MRN: [id number]  DATE: 2019  TIME: 3:17 PM    Assessment and Plan   Acute frontal sinusitis, recurrence not specified [J01 10]  1  Acute frontal sinusitis, recurrence not specified  azithromycin (ZITHROMAX) 250 mg tablet    Methylprednisolone 4 MG TBPK         Patient Instructions       Follow up with PCP in 3-5 days  Proceed to  ER if symptoms worsen  Chief Complaint     Chief Complaint   Patient presents with    Recurrent Sinusitis     PT was diagnosed 3 weeks ago with a sinus infection and ear infection diagnoses by her PCP symptoms have reaccured with face swelling    Earache    Facial Pain    Headache         History of Present Illness        21year-old female complains of 2 days of sinus pressure in her cheeks and cough and congestion  She was seen 3 weeks ago by her family doctor and treated with antibiotics and  Decongestants     She got better but got sick again  No  Fever home  No nausea or vomiting  No chest pain shortness of breath          Review of Systems   Review of Systems      Current Medications       Current Outpatient Prescriptions:     EPINEPHrine (EPIPEN) 0 3 mg/0 3 mL SOAJ, Inject 0 3 mg into the shoulder, thigh, or buttocks once, Disp: , Rfl:     azithromycin (ZITHROMAX) 250 mg tablet, 2 tabs on day 1, then 1 tab daily for 4 days, Disp: 6 tablet, Rfl: 0    Methylprednisolone 4 MG TBPK, Use as directed on package, Disp: 21 tablet, Rfl: 0    norgestimate-ethinyl estradiol (ORTHO TRI-CYCLEN,TRINESSA) 0 18/0 215/0 25 MG-35 MCG per tablet, Take 1 tablet by mouth daily, Disp: 90 tablet, Rfl: 3    Current Allergies     Allergies as of 2019 - Reviewed 2018   Allergen Reaction Noted    Shellfish-derived products Shortness Of Breath 2018            The following portions of the patient's history were reviewed and updated as appropriate: allergies, current medications, past family history, past medical history, past social history, past surgical history and problem list      Past Medical History:   Diagnosis Date    Deviated septum     Eczema     Recurrent sinus infections     Seizures ([address] )     febrile as a child "grew out of them"    Wears glasses        Past Surgical History:   Procedure Laterality Date    APPENDECTOMY      LYMPH NODE DISSECTION      "in neck completely removed age 11"   [facility] NOSE SURGERY  2017    NE NASAL/SINUS [address] W/TOTAL ETHOIDECTOMY N/A 2/28/2018    Procedure: PARTIAL INFERIOR TURBINATE REDUCTION;  Surgeon: Fredy Saunders DO;  [facility];  Service: ENT    NE REPAIR OF NASAL SEPTUM N/A 2/28/2018    Procedure: SEPTOPLASTY WITH REMOVAL OF RIGHT FLOOR SPUR;  Surgeon: Fredy Saunders DO;  [facility];  Service: ENT    NE STEREOTACTIC COMP ASSIST PROC,CRANIAL,EXTRADURAL N/A 2/28/2018    Procedure: FUNCTIONAL ENDOSCOPIC SINUS SURGERY (FESS) IMAGED GUIDED; Surgeon: Fredy Saunders DO;  [facility];  Service: ENT       Family History   Problem Relation Age of Onset    No Known Problems Mother     No Known Problems Father          Medications have been verified  Objective   /59 (BP Location: Right arm, Patient Position: Sitting, Cuff Size: Standard)   Pulse 98   Temp 98 7 °F (37 1 °C)   Resp 18   Ht 5' 5" (1 651 m)   Wt 93 kg (205 lb)   SpO2 100%   BMI 34 11 kg/m²        Physical Exam     Physical Exam   Constitutional: She appears well-developed and well-nourished  No distress  HENT:   Right Ear: Tympanic membrane, external ear and ear canal normal    Left Ear: Tympanic membrane, external ear and ear canal normal    Nose: Mucosal edema and rhinorrhea present  Right sinus exhibits maxillary sinus tenderness and frontal sinus tenderness  Left sinus exhibits no maxillary sinus tenderness and no frontal sinus tenderness  Mouth/Throat: Oropharynx is clear and moist  No posterior oropharyngeal erythema  Eyes: Pupils are equal, round, and reactive to light  Conjunctivae and EOM are normal  No scleral icterus  Neck: Normal range of motion  Neck supple  Cardiovascular: Normal rate, regular rhythm and normal heart sounds  Pulmonary/Chest: Effort normal and breath sounds normal  No respiratory distress  She has no wheezes  She has no rales  Abdominal: Soft  Bowel sounds are normal  She exhibits no distension and no mass  There is no tenderness  There is no rebound and no guarding  Lymphadenopathy:     She has no cervical adenopathy  Skin: Skin is warm and dry  No rash noted

## 2019-03-14 DIAGNOSIS — Z30.41 ENCOUNTER FOR SURVEILLANCE OF CONTRACEPTIVE PILLS: ICD-10-CM

## 2019-03-18 RX ORDER — NORGESTIMATE AND ETHINYL ESTRADIOL 7DAYSX3 28
KIT ORAL
Qty: 84 TABLET | Refills: 3 | Status: SHIPPED | OUTPATIENT
Start: 2019-03-18 | End: 2020-03-02

## 2019-03-20 ENCOUNTER — DOCUMENTATION (OUTPATIENT)
Dept: GYNECOLOGY | Facility: CLINIC | Age: 29
End: 2019-03-20

## 2019-10-13 ENCOUNTER — OFFICE VISIT (OUTPATIENT)
Dept: URGENT CARE | Facility: CLINIC | Age: 29
End: 2019-10-13
Payer: COMMERCIAL

## 2019-10-13 VITALS
SYSTOLIC BLOOD PRESSURE: 121 MMHG | HEART RATE: 81 BPM | RESPIRATION RATE: 18 BRPM | TEMPERATURE: 98.3 F | OXYGEN SATURATION: 97 % | DIASTOLIC BLOOD PRESSURE: 77 MMHG

## 2019-10-13 DIAGNOSIS — J06.9 VIRAL UPPER RESPIRATORY TRACT INFECTION: Primary | ICD-10-CM

## 2019-10-13 PROCEDURE — 99214 OFFICE O/P EST MOD 30 MIN: CPT | Performed by: NURSE PRACTITIONER

## 2019-10-13 RX ORDER — METHYLPREDNISOLONE 4 MG/1
TABLET ORAL
Qty: 1 EACH | Refills: 0 | Status: SHIPPED | OUTPATIENT
Start: 2019-10-13 | End: 2020-01-21 | Stop reason: ALTCHOICE

## 2019-10-13 NOTE — PROGRESS NOTES
[facility]        NAME: Gloria Tyler is a 29 y o  female  : 1990    MRN: [id number]  DATE: 2019  TIME: 1:47 PM    Assessment and Plan   Viral upper respiratory tract infection [J06 9]  1  Viral upper respiratory tract infection  methylPREDNISolone 4 MG tablet therapy pack         Patient Instructions     Patient Instructions     Viruses are worst for the first 3-5 days, mostly better by days 7-10 and all the way better by days 7-14  If you are not feeling improvement by the 7-10 day aurelio, or if your symptoms significantly change, you should be seen again  Upper Respiratory Infection   AMBULATORY CARE:   An upper respiratory infection  is also called a common cold  It can affect your nose, throat, ears, and sinuses  Common signs and symptoms include the following:  Cold symptoms are usually worst for the first 3 to 5 days  You may have any of the following:  · Runny or stuffy nose    · Sneezing and coughing    · Sore throat or hoarseness    · Red, watery, and sore eyes    · Fatigue     · Chills and fever    · Headache, body aches, or sore muscles  Seek care immediately if:   · You have chest pain or trouble breathing  Contact your healthcare provider if:   · You have a fever over 102ºF (39°C)  · Your sore throat gets worse or you see white or yellow spots in your throat  · Your symptoms get worse after 3 to 5 days or your cold is not better in 14 days  · You have a rash anywhere on your skin  · You have large, tender lumps in your neck  · You have thick, green or yellow drainage from your nose  · You cough up thick yellow, green, or bloody mucus  · You have vomiting for more than 24 hours and cannot keep fluids down  · You have a bad earache  · You have questions or concerns about your condition or care  Treatment for a cold: There is no cure for the common cold  Colds are caused by viruses and do not get better with antibiotics   Most people get better in 7 to 14 days  You may continue to cough for 2 to 3 weeks  The following may help decrease your symptoms:  · Decongestants  help reduce nasal congestion and help you breathe more easily  If you take decongestant pills, they may make you feel restless or not able to sleep  Do not use decongestant sprays for more than a few days  · Cough suppressants  help reduce coughing  Ask your healthcare provider which type of cough medicine is best for you  · NSAIDs , such as ibuprofen, help decrease swelling, pain, and fever  NSAIDs can cause stomach bleeding or kidney problems in certain people  If you take blood thinner medicine, always ask your healthcare provider if NSAIDs are safe for you  Always read the medicine label and follow directions  · Acetaminophen  decreases pain and fever  It is available without a doctor's order  Ask how much to take and how often to take it  Follow directions  Read the labels of all other medicines you are using to see if they also contain acetaminophen, or ask your doctor or pharmacist  Acetaminophen can cause liver damage if not taken correctly  Do not use more than 4 grams (4,000 milligrams) total of acetaminophen in one day  Manage your cold:   · Rest as much as possible  Slowly start to do more each day  · Drink more liquids as directed  Liquids will help thin and loosen mucus so you can cough it up  Liquids will also help prevent dehydration  Liquids that help prevent dehydration include water, fruit juice, and broth  Do not drink liquids that contain caffeine  Caffeine can increase your risk for dehydration  Ask your healthcare provider how much liquid to drink each day  · Soothe a sore throat  Gargle with warm salt water  This helps your sore throat feel better  Make salt water by dissolving ¼ teaspoon salt in 1 cup warm water  You may also suck on hard candy or throat lozenges  You may use a sore throat spray  · Use a humidifier or vaporizer    Use a cool mist humidifier or a vaporizer to increase air moisture in your home  This may make it easier for you to breathe and help decrease your cough  · Use saline nasal drops as directed  These help relieve congestion  · Apply petroleum-based jelly around the outside of your nostrils  This can decrease irritation from blowing your nose  · Do not smoke  Nicotine and other chemicals in cigarettes and cigars can make your symptoms worse  They can also cause infections such as bronchitis or pneumonia  Ask your healthcare provider for information if you currently smoke and need help to quit  E-cigarettes or smokeless tobacco still contain nicotine  Talk to your healthcare provider before you use these products  Prevent spreading your cold to others:   · Try to stay away from other people during the first 2 to 3 days of your cold when it is more easily spread  · Do not share food or drinks  · Do not share hand towels with household members  · Wash your hands often, especially after you blow your nose  Turn away from other people and cover your mouth and nose with a tissue when you sneeze or cough  Follow up with your healthcare provider as directed:  Write down your questions so you remember to ask them during your visits  © 2017 [facility] [employer]  Information is for End User's use only and may not be sold, redistributed or otherwise used for commercial purposes  All illustrations and images included in CareNotes® are the copyrighted property of [employer]  or Emmanuel Liriano  The above information is an  only  It is not intended as medical advice for individual conditions or treatments  Talk to your doctor, nurse or pharmacist before following any medical regimen to see if it is safe and effective for you  Follow up with PCP in 3-5 days  Proceed to  ER if symptoms worsen      Chief Complaint     Chief Complaint   Patient presents with   Fina Mi Earache     Pt c/o bilateral ear pain since yesterday  History of Present Illness       Patient presents complaining of bilateral ear pain  She knowledge is a longstanding history of sinus problems including 2 prior sinus surgeries  She states she was recommended to have a 3rd surgery that she has declined  She acknowledges a history of allergies but states that she only takes Zyrtec in season, and that her allergies have not been bad lately, so she has not taken in quite a while  She notes increased nasal pressure, scratchy throat, ear pain and itching over the last 24-36 hours  No medications have been tried  Review of Systems   Review of Systems   HENT: Positive for congestion, ear pain, postnasal drip, rhinorrhea, sinus pressure, sinus pain and sore throat  Negative for ear discharge  Respiratory: Negative for cough, chest tightness, shortness of breath and wheezing  Gastrointestinal: Negative for diarrhea, nausea and vomiting  All other systems reviewed and are negative          Current Medications       Current Outpatient Medications:     EPINEPHrine (EPIPEN) 0 3 mg/0 3 mL SOAJ, Inject 0 3 mg into the shoulder, thigh, or buttocks once, Disp: , Rfl:     methylPREDNISolone 4 MG tablet therapy pack, Use as directed on package, Disp: 1 each, Rfl: 0    norgestimate-ethinyl estradiol (ORTHO TRI-CYCLEN,TRINESSA) 0 18/0 215/0 25 MG-35 MCG per tablet, take 1 tablet by mouth once daily, Disp: 84 tablet, Rfl: 3    Current Allergies     Allergies as of 10/13/2019 - Reviewed 10/13/2019   Allergen Reaction Noted    Shellfish-derived products Shortness Of Breath 02/22/2018            The following portions of the patient's history were reviewed and updated as appropriate: allergies, current medications, past family history, past medical history, past social history, past surgical history and problem list      Past Medical History:   Diagnosis Date    Deviated septum     Eczema     Recurrent sinus infections     Seizures ([facility])     febrile as a child "grew out of them"    Wears glasses        Past Surgical History:   Procedure Laterality Date    APPENDECTOMY      LYMPH NODE DISSECTION      "in neck completely removed age 11"   [employer] NOSE SURGERY  2017    ND NASAL/SINUS [facility] W/TOTAL ETHOIDECTOMY N/A 2/28/2018    Procedure: PARTIAL INFERIOR TURBINATE REDUCTION;  Surgeon: Marco Phoenix DO;  [facility];  Service: ENT    ND REPAIR OF NASAL SEPTUM N/A 2/28/2018    Procedure: SEPTOPLASTY WITH REMOVAL OF RIGHT FLOOR SPUR;  Surgeon: Marco Phoenix DO;  [facility];  Service: ENT    ND STEREOTACTIC COMP ASSIST PROC,CRANIAL,EXTRADURAL N/A 2/28/2018    Procedure: FUNCTIONAL ENDOSCOPIC SINUS SURGERY (FESS) IMAGED GUIDED; Surgeon: Marco Phoenix DO;  [facility];  Service: ENT       Family History   Problem Relation Age of Onset    No Known Problems Mother     No Known Problems Father          Medications have been verified  Objective   /77   Pulse 81   Temp 98 3 °F (36 8 °C)   Resp 18   SpO2 97%        Physical Exam     Physical Exam   Constitutional: She is oriented to person, place, and time  She appears well-developed and well-nourished  She is cooperative  Non-toxic appearance  She appears ill  No distress  HENT:   Head: Normocephalic and atraumatic  Right Ear: Hearing, external ear and ear canal normal  There is tenderness  Tympanic membrane is bulging  Tympanic membrane is not injected, not perforated and not erythematous  Left Ear: Hearing, external ear and ear canal normal  There is tenderness  Tympanic membrane is bulging  Tympanic membrane is not injected, not perforated and not erythematous  Nose: Mucosal edema, rhinorrhea and sinus tenderness present  Right sinus exhibits maxillary sinus tenderness  Right sinus exhibits no frontal sinus tenderness  Left sinus exhibits maxillary sinus tenderness   Left sinus exhibits no frontal sinus tenderness  Mouth/Throat: Uvula is midline and mucous membranes are normal  Posterior oropharyngeal erythema (Slight) present  No oropharyngeal exudate, posterior oropharyngeal edema or tonsillar abscesses  Tonsils are 1+ on the right  Tonsils are 1+ on the left  No tonsillar exudate  Eyes: Pupils are equal, round, and reactive to light  Conjunctivae are normal  Right eye exhibits no discharge  Left eye exhibits no discharge  Neck: Normal range of motion  Neck supple  Cardiovascular: Normal rate, regular rhythm and normal heart sounds  Pulmonary/Chest: Effort normal and breath sounds normal  No accessory muscle usage or stridor  No apnea, no tachypnea and no bradypnea  No respiratory distress  She has no decreased breath sounds  She has no wheezes  She has no rhonchi  She has no rales  Abdominal: Soft  Bowel sounds are normal  She exhibits no distension  There is no tenderness  Musculoskeletal: Normal range of motion  Lymphadenopathy:     She has no cervical adenopathy  Neurological: She is alert and oriented to person, place, and time  Skin: Skin is warm and dry  Capillary refill takes less than 2 seconds  She is not diaphoretic  Psychiatric: She has a normal mood and affect  Her behavior is normal  Judgment and thought content normal    Nursing note and vitals reviewed

## 2019-10-13 NOTE — PATIENT INSTRUCTIONS
Viruses are worst for the first 3-5 days, mostly better by days 7-10 and all the way better by days 7-14  If you are not feeling improvement by the 7-10 day aurelio, or if your symptoms significantly change, you should be seen again  Upper Respiratory Infection   AMBULATORY CARE:   An upper respiratory infection  is also called a common cold  It can affect your nose, throat, ears, and sinuses  Common signs and symptoms include the following:  Cold symptoms are usually worst for the first 3 to 5 days  You may have any of the following:  · Runny or stuffy nose    · Sneezing and coughing    · Sore throat or hoarseness    · Red, watery, and sore eyes    · Fatigue     · Chills and fever    · Headache, body aches, or sore muscles  Seek care immediately if:   · You have chest pain or trouble breathing  Contact your healthcare provider if:   · You have a fever over 102ºF (39°C)  · Your sore throat gets worse or you see white or yellow spots in your throat  · Your symptoms get worse after 3 to 5 days or your cold is not better in 14 days  · You have a rash anywhere on your skin  · You have large, tender lumps in your neck  · You have thick, green or yellow drainage from your nose  · You cough up thick yellow, green, or bloody mucus  · You have vomiting for more than 24 hours and cannot keep fluids down  · You have a bad earache  · You have questions or concerns about your condition or care  Treatment for a cold: There is no cure for the common cold  Colds are caused by viruses and do not get better with antibiotics  Most people get better in 7 to 14 days  You may continue to cough for 2 to 3 weeks  The following may help decrease your symptoms:  · Decongestants  help reduce nasal congestion and help you breathe more easily  If you take decongestant pills, they may make you feel restless or not able to sleep  Do not use decongestant sprays for more than a few days      · Cough suppressants help reduce coughing  Ask your healthcare provider which type of cough medicine is best for you  · NSAIDs , such as ibuprofen, help decrease swelling, pain, and fever  NSAIDs can cause stomach bleeding or kidney problems in certain people  If you take blood thinner medicine, always ask your healthcare provider if NSAIDs are safe for you  Always read the medicine label and follow directions  · Acetaminophen  decreases pain and fever  It is available without a doctor's order  Ask how much to take and how often to take it  Follow directions  Read the labels of all other medicines you are using to see if they also contain acetaminophen, or ask your doctor or pharmacist  Acetaminophen can cause liver damage if not taken correctly  Do not use more than 4 grams (4,000 milligrams) total of acetaminophen in one day  Manage your cold:   · Rest as much as possible  Slowly start to do more each day  · Drink more liquids as directed  Liquids will help thin and loosen mucus so you can cough it up  Liquids will also help prevent dehydration  Liquids that help prevent dehydration include water, fruit juice, and broth  Do not drink liquids that contain caffeine  Caffeine can increase your risk for dehydration  Ask your healthcare provider how much liquid to drink each day  · Soothe a sore throat  Gargle with warm salt water  This helps your sore throat feel better  Make salt water by dissolving ¼ teaspoon salt in 1 cup warm water  You may also suck on hard candy or throat lozenges  You may use a sore throat spray  · Use a humidifier or vaporizer  Use a cool mist humidifier or a vaporizer to increase air moisture in your home  This may make it easier for you to breathe and help decrease your cough  · Use saline nasal drops as directed  These help relieve congestion  · Apply petroleum-based jelly around the outside of your nostrils  This can decrease irritation from blowing your nose       · Do not smoke   Nicotine and other chemicals in cigarettes and cigars can make your symptoms worse  They can also cause infections such as bronchitis or pneumonia  Ask your healthcare provider for information if you currently smoke and need help to quit  E-cigarettes or smokeless tobacco still contain nicotine  Talk to your healthcare provider before you use these products  Prevent spreading your cold to others:   · Try to stay away from other people during the first 2 to 3 days of your cold when it is more easily spread  · Do not share food or drinks  · Do not share hand towels with household members  · Wash your hands often, especially after you blow your nose  Turn away from other people and cover your mouth and nose with a tissue when you sneeze or cough  Follow up with your healthcare provider as directed:  Write down your questions so you remember to ask them during your visits  © 2017 [address] [employer] Information is for End User's use only and may not be sold, redistributed or otherwise used for commercial purposes  All illustrations and images included in CareNotes® are the copyrighted property of A D A M [employer]  or Emmanuel Liriano  The above information is an  only  It is not intended as medical advice for individual conditions or treatments  Talk to your doctor, nurse or pharmacist before following any medical regimen to see if it is safe and effective for you

## 2020-01-05 ENCOUNTER — HOSPITAL ENCOUNTER (EMERGENCY)
Facility: HOSPITAL | Age: 30
Discharge: HOME | End: 2020-01-06
Attending: EMERGENCY MEDICINE | Admitting: EMERGENCY MEDICINE
Payer: COMMERCIAL

## 2020-01-05 ENCOUNTER — APPOINTMENT (EMERGENCY)
Dept: CT IMAGING | Facility: HOSPITAL | Age: 30
End: 2020-01-05
Payer: COMMERCIAL

## 2020-01-05 DIAGNOSIS — R51.9 ACUTE NONINTRACTABLE HEADACHE, UNSPECIFIED HEADACHE TYPE: ICD-10-CM

## 2020-01-05 DIAGNOSIS — R42 VERTIGO: Primary | ICD-10-CM

## 2020-01-05 LAB
ALBUMIN SERPL BCP-MCNC: 4 G/DL (ref 3.5–5.7)
ALP SERPL-CCNC: 52 U/L (ref 40–150)
ALT SERPL W P-5'-P-CCNC: 8 U/L (ref 7–52)
ANION GAP SERPL CALCULATED.3IONS-SCNC: 8 MMOL/L (ref 4–13)
APTT PPP: 27 SECONDS (ref 23–37)
AST SERPL W P-5'-P-CCNC: 13 U/L (ref 13–39)
BASOPHILS # BLD AUTO: 0 THOUSANDS/ΜL (ref 0–0.1)
BASOPHILS NFR BLD AUTO: 1 % (ref 0–2)
BILIRUB SERPL-MCNC: 0.6 MG/DL (ref 0.2–1)
BUN SERPL-MCNC: 11 MG/DL (ref 7–25)
CALCIUM SERPL-MCNC: 9.2 MG/DL (ref 8.6–10.5)
CHLORIDE SERPL-SCNC: 105 MMOL/L (ref 98–107)
CK SERPL-CCNC: 26 U/L (ref 30–192)
CO2 SERPL-SCNC: 24 MMOL/L (ref 21–31)
CREAT SERPL-MCNC: 0.76 MG/DL (ref 0.6–1.2)
EOSINOPHIL # BLD AUTO: 0.2 THOUSAND/ΜL (ref 0–0.61)
EOSINOPHIL NFR BLD AUTO: 3 % (ref 0–5)
ERYTHROCYTE [DISTWIDTH] IN BLOOD BY AUTOMATED COUNT: 13.6 % (ref 11.5–14.5)
GFR SERPL CREATININE-BSD FRML MDRD: 106 ML/MIN/1.73SQ M
GLUCOSE SERPL-MCNC: 87 MG/DL (ref 65–99)
HCG SERPL QL: NEGATIVE
HCT VFR BLD AUTO: 38.4 % (ref 42–47)
HGB BLD-MCNC: 12.6 G/DL (ref 12–16)
INR PPP: 1.16 (ref 0.9–1.5)
LYMPHOCYTES # BLD AUTO: 1.6 THOUSANDS/ΜL (ref 0.6–4.47)
LYMPHOCYTES NFR BLD AUTO: 25 % (ref 21–51)
MAGNESIUM SERPL-MCNC: 1.9 MG/DL (ref 1.9–2.7)
MCH RBC QN AUTO: 28.8 PG (ref 26–34)
MCHC RBC AUTO-ENTMCNC: 32.8 G/DL (ref 31–37)
MCV RBC AUTO: 88 FL (ref 81–99)
MONOCYTES # BLD AUTO: 0.4 THOUSAND/ΜL (ref 0.17–1.22)
MONOCYTES NFR BLD AUTO: 7 % (ref 2–12)
NEUTROPHILS # BLD AUTO: 4.2 THOUSANDS/ΜL (ref 1.4–6.5)
NEUTS SEG NFR BLD AUTO: 65 % (ref 42–75)
PLATELET # BLD AUTO: 305 THOUSANDS/UL (ref 149–390)
PMV BLD AUTO: 7.1 FL (ref 8.6–11.7)
POTASSIUM SERPL-SCNC: 3.6 MMOL/L (ref 3.5–5.5)
PROT SERPL-MCNC: 7 G/DL (ref 6.4–8.9)
PROTHROMBIN TIME: 13.4 SECONDS (ref 10.2–13)
RBC # BLD AUTO: 4.38 MILLION/UL (ref 3.9–5.2)
SODIUM SERPL-SCNC: 137 MMOL/L (ref 134–143)
TSH SERPL DL<=0.05 MIU/L-ACNC: 1.51 UIU/ML (ref 0.45–5.33)
WBC # BLD AUTO: 6.5 THOUSAND/UL (ref 4.8–10.8)

## 2020-01-05 PROCEDURE — 85730 THROMBOPLASTIN TIME PARTIAL: CPT | Performed by: EMERGENCY MEDICINE

## 2020-01-05 PROCEDURE — 84703 CHORIONIC GONADOTROPIN ASSAY: CPT | Performed by: EMERGENCY MEDICINE

## 2020-01-05 PROCEDURE — 96375 TX/PRO/DX INJ NEW DRUG ADDON: CPT

## 2020-01-05 PROCEDURE — 84443 ASSAY THYROID STIM HORMONE: CPT | Performed by: EMERGENCY MEDICINE

## 2020-01-05 PROCEDURE — 99284 EMERGENCY DEPT VISIT MOD MDM: CPT

## 2020-01-05 PROCEDURE — 96365 THER/PROPH/DIAG IV INF INIT: CPT

## 2020-01-05 PROCEDURE — 70450 CT HEAD/BRAIN W/O DYE: CPT

## 2020-01-05 PROCEDURE — 80053 COMPREHEN METABOLIC PANEL: CPT | Performed by: EMERGENCY MEDICINE

## 2020-01-05 PROCEDURE — 83735 ASSAY OF MAGNESIUM: CPT | Performed by: EMERGENCY MEDICINE

## 2020-01-05 PROCEDURE — 82550 ASSAY OF CK (CPK): CPT | Performed by: EMERGENCY MEDICINE

## 2020-01-05 PROCEDURE — 96366 THER/PROPH/DIAG IV INF ADDON: CPT

## 2020-01-05 PROCEDURE — 85610 PROTHROMBIN TIME: CPT | Performed by: EMERGENCY MEDICINE

## 2020-01-05 PROCEDURE — 96361 HYDRATE IV INFUSION ADD-ON: CPT

## 2020-01-05 PROCEDURE — 85025 COMPLETE CBC W/AUTO DIFF WBC: CPT | Performed by: EMERGENCY MEDICINE

## 2020-01-05 PROCEDURE — 99284 EMERGENCY DEPT VISIT MOD MDM: CPT | Performed by: EMERGENCY MEDICINE

## 2020-01-05 PROCEDURE — 36415 COLL VENOUS BLD VENIPUNCTURE: CPT | Performed by: EMERGENCY MEDICINE

## 2020-01-05 RX ORDER — ONDANSETRON 2 MG/ML
4 INJECTION INTRAMUSCULAR; INTRAVENOUS ONCE
Status: COMPLETED | OUTPATIENT
Start: 2020-01-05 | End: 2020-01-05

## 2020-01-05 RX ORDER — MAGNESIUM SULFATE 1 G/100ML
1 INJECTION INTRAVENOUS ONCE
Status: COMPLETED | OUTPATIENT
Start: 2020-01-05 | End: 2020-01-06

## 2020-01-05 RX ORDER — MECLIZINE HCL 12.5 MG/1
25 TABLET ORAL ONCE
Status: COMPLETED | OUTPATIENT
Start: 2020-01-05 | End: 2020-01-05

## 2020-01-05 RX ADMIN — MAGNESIUM SULFATE HEPTAHYDRATE 1 G: 1 INJECTION, SOLUTION INTRAVENOUS at 22:48

## 2020-01-05 RX ADMIN — SODIUM CHLORIDE 1000 ML: 0.9 INJECTION, SOLUTION INTRAVENOUS at 21:51

## 2020-01-05 RX ADMIN — MECLIZINE 25 MG: 12.5 TABLET ORAL at 21:42

## 2020-01-05 RX ADMIN — ONDANSETRON 4 MG: 2 INJECTION INTRAMUSCULAR; INTRAVENOUS at 21:52

## 2020-01-06 VITALS
HEART RATE: 73 BPM | DIASTOLIC BLOOD PRESSURE: 79 MMHG | WEIGHT: 200 LBS | OXYGEN SATURATION: 99 % | RESPIRATION RATE: 16 BRPM | BODY MASS INDEX: 32.14 KG/M2 | HEIGHT: 66 IN | TEMPERATURE: 98.3 F | SYSTOLIC BLOOD PRESSURE: 128 MMHG

## 2020-01-06 PROCEDURE — 96361 HYDRATE IV INFUSION ADD-ON: CPT

## 2020-01-06 RX ORDER — ONDANSETRON 4 MG/1
4 TABLET, ORALLY DISINTEGRATING ORAL EVERY 6 HOURS PRN
Qty: 10 TABLET | Refills: 0 | Status: SHIPPED | OUTPATIENT
Start: 2020-01-06 | End: 2020-01-21 | Stop reason: ALTCHOICE

## 2020-01-06 RX ORDER — MECLIZINE HYDROCHLORIDE 25 MG/1
25 TABLET ORAL 3 TIMES DAILY PRN
Qty: 30 TABLET | Refills: 0 | Status: SHIPPED | OUTPATIENT
Start: 2020-01-06 | End: 2020-08-11 | Stop reason: ALTCHOICE

## 2020-01-06 NOTE — ED NOTES
1st contact since triage  Ppt in nad  Stated dizziness was nearly gone and that she was capabnle of ambulating  Denied questions or needs at time of d/c        Tiffany Estrada RN  01/06/20 [id number]

## 2020-01-06 NOTE — DISCHARGE INSTRUCTIONS
RETURN IF WORSE IN ANY WAY:   INCREASED PAIN, FEVER OR FLU LIKE SYMPTOMS, OR NEW AND CONCERNING SYMPTOMS SIGNS OR SYMPTOMS    PLEASE CALL YOUR PRIMARY DOCTOR IN THE MORNING TO SET UP FOLLOW UP   PLEASE REVIEW THE WORK UP RESULTS WITH YOUR DOCTOR      USE EXTREME CAUTION WHILE TAKING Meclizine FOR Dizziness  It can cause sleepiness  NEVER TAKE WITH OTHER SEDATIVE MEDICATIONS OR SUBSTANCES, SUCH AS SLEEPING MEDICATIONS OR ALCOHOL OR OTHER SUCH SUBSTANCES  PLEASE DISCUSS THE ABOVE WITH YOUR FAMILY DOCTOR

## 2020-01-06 NOTE — ED PROVIDER NOTES
History  Chief Complaint   Patient presents with    Dizziness     DIZZY SINCE LAST NIGHT (ROOM SPINNING) TODAY WITH NAUSEA AND "STARTING TO GET A HEADACHE       34-YEAR-OLD ([employer]  PMH: none  Meds: OC  Soc: social drinker, Non SMOKER  , no drugs  FAMILY HISTORY:  None      MODE OF TRANSPORT:   AMBULATORY, PRIVATE VEHICLE, Boyfriend at bedside Drove her here      CHIEF COMPLAINT:  Pt here for dizziness and headache and nausea    Dizziness started last night before bed, around 11:30, pt thought she was just tired  This morning around 8:30am she still felt dizzy  Nausea started around 1:30  6pm the headache came one    Dizziness is rated as Moderate this morning, getting worse  Headache is rated 4/10    ALEVEING OR EXACERBATING FACTORS:   Laying still on the side helps her dizziness  Laying on her back is worse, also opening eyes and moving       No focal complaints:   No pain in the eyes  No unilateral weakness or numbness    THERE ARE NO OTHER INJURIES  SHE DOES NOT RECALL HITTING HER HEAD    No seizure activity    SHE IS ABLE TO STAND AND WALK - she is walking without assistance, but feels like the dizziness causes her to hold onto things    PT DENIES CP OR PRESSURE  PATIENT HAS NO SHORTNESS OF BREATH  NO   COMPLAINTS OF DIAPHORESIS  SHE DENIES ANY RADIATION OF PAIN TO THE JAW NECK OR THE BACK  PT HAS NO EPISODES PALPITATIONS        INTERVENTIONS:   NONE      INFECTIOUS SYMPTOMS: NONE  PATIENT DENIES ANY COUGH, NO FEVERS OR CHILLS  NO URI SYMPTOMS  NO SORE THROAT  NO SINUS SYMPTOMS  NO LYMPHADENOPATHY      VTE  RISK FACTORS:  NONE  NO HISTORY OF PE OR DVT  NO LONG CAR RIDES OR PLANE RIDES  NO IMMOBILIZATION  NO RECENT SURGERY OR TRAUMA  NO HEMOPTYSIS  OTHER ASSOCIATED SYMPTOMS:  NO ABDOMINAL PAIN  NO VOMITING  NO STOOL CHANGES    NO URINARY COMPLAINTS: NO DYSURIA, NO HEMATURIA, NO FREQUENCY  NO LOSS OF BOWEL OR BLADDER DURING THESE EPISODES  RECENT TRAUMA: NONE          History provided by: Patient  Dizziness   Quality:  Lightheadedness  Severity:  Moderate  Onset quality:  Gradual  Timing:  Constant  Progression:  Worsening  Context: bending over and head movement    Relieved by:  Closing eyes and lying down  Worsened by: Movement  Ineffective treatments:  None tried  Associated symptoms: headaches and nausea    Associated symptoms: no blood in stool, no chest pain, no diarrhea, no palpitations, no shortness of breath, no syncope, no tinnitus, no vision changes, no vomiting and no weakness    Headaches:     Severity:  Mild    Onset quality:  Gradual    Timing:  Constant    Progression:  Unchanged  Risk factors: no anemia, no heart disease, no hx of stroke, no hx of vertigo, no Meniere's disease, no multiple medications and no new medications        Prior to Admission Medications   Prescriptions Last Dose Informant Patient Reported? Taking? EPINEPHrine (EPIPEN) 0 3 mg/0 3 mL SOAJ   Yes Yes   Sig: Inject 0 3 mg into the shoulder, thigh, or buttocks once   methylPREDNISolone 4 MG tablet therapy pack   No No   Sig: Use as directed on package   norgestimate-ethinyl estradiol (ORTHO TRI-CYCLEN,TRINESSA) 0 18/0 215/0 25 MG-35 MCG per tablet   No Yes   Sig: take 1 tablet by mouth once daily      Facility-Administered Medications: None       Past Medical History:   Diagnosis Date    Deviated septum     Eczema     Recurrent sinus infections     Seizures (HCC)     febrile as a child "grew out of them"    Wears glasses        Past Surgical History:   Procedure Laterality Date    APPENDECTOMY      LYMPH NODE DISSECTION      "in neck completely removed age 11"   [facility]SE SURGERY  2017    OK NASAL/SINUS NDSC W/TOTAL ETHOIDECTOMY N/A 2/28/2018    Procedure: PARTIAL INFERIOR TURBINATE REDUCTION;  Surgeon: Gladis Tipton DO;  Location: [facility];  Service: ENT    OK REPAIR OF NASAL SEPTUM N/A 2/28/2018    Procedure: SEPTOPLASTY WITH REMOVAL OF RIGHT FLOOR SPUR;  Surgeon:  Gladis Tipton DO;  Location[facility];  Service: ENT    [place] STEREOTACTIC COMP ASSIST PROC,CRANIAL,EXTRADURAL N/A 2/28/2018    Procedure: FUNCTIONAL ENDOSCOPIC SINUS SURGERY (FESS) IMAGED GUIDED; Surgeon: Claybon Lesches, DO;  Location[facility];  Service: ENT       Family History   Problem Relation Age of Onset    No Known Problems Mother     No Known Problems Father      I have reviewed and agree with the history as documented  Social History     Tobacco Use    Smoking status: Never Smoker    Smokeless tobacco: Never Used   Substance Use Topics    Alcohol use: Yes     Comment: social    Drug use: No        Review of Systems   Constitutional: Negative for chills, diaphoresis, fatigue and fever  HENT: Negative for tinnitus  Respiratory: Negative for cough, shortness of breath, wheezing and stridor  Cardiovascular: Negative for chest pain, palpitations, leg swelling and syncope  Gastrointestinal: Positive for nausea  Negative for abdominal pain, blood in stool, diarrhea and vomiting  Genitourinary: Negative for difficulty urinating, dysuria, flank pain and frequency  Musculoskeletal: Negative for arthralgias, back pain, gait problem, joint swelling, myalgias, neck pain and neck stiffness  Skin: Negative for rash and wound  Neurological: Positive for dizziness and headaches  Negative for weakness and light-headedness  All other systems reviewed and are negative  Physical Exam  Physical Exam   Constitutional: She is oriented to person, place, and time  She appears well-developed and well-nourished  No distress  HENT:   Head: Normocephalic and atraumatic  Nose: Nose normal    Mouth/Throat: Oropharynx is clear and moist  No oropharyngeal exudate  Eyes: Pupils are equal, round, and reactive to light  Conjunctivae and EOM are normal  Right eye exhibits no discharge  Left eye exhibits no discharge  No scleral icterus  Neck: Normal range of motion  Neck supple  No JVD present  No tracheal deviation present  Cardiovascular: Normal rate, regular rhythm, normal heart sounds and intact distal pulses  Exam reveals no gallop and no friction rub  No murmur heard  Pulmonary/Chest: Effort normal and breath sounds normal  No stridor  No respiratory distress  She has no wheezes  She has no rales  She exhibits no tenderness  Abdominal: Soft  Bowel sounds are normal  She exhibits no distension and no mass  There is no tenderness  There is no rebound and no guarding  No hernia  Musculoskeletal: Normal range of motion  She exhibits no edema, tenderness or deformity  Lymphadenopathy:     She has no cervical adenopathy  Neurological: She is alert and oriented to person, place, and time  No cranial nerve deficit or sensory deficit  She exhibits normal muscle tone  Coordination normal    Skin: Skin is warm  Capillary refill takes less than 2 seconds  No rash noted  She is not diaphoretic  No erythema  No pallor  Psychiatric: She has a normal mood and affect   Her behavior is normal  Judgment and thought content normal        Vital Signs  ED Triage Vitals [01/05/20 2028]   Temperature Pulse Respirations Blood Pressure SpO2   98 3 °F (36 8 °C) 70 16 124/83 100 %      Temp src Heart Rate Source Patient Position - Orthostatic VS BP Location FiO2 (%)   -- -- -- -- --      Pain Score       No Pain           Vitals:    01/05/20 2028 01/06/20 0100   BP: 124/83 128/79   Pulse: 70 73         Visual Acuity      ED Medications  Medications   sodium chloride 0 9 % bolus 1,000 mL (0 mL Intravenous Stopped 1/6/20 0107)   ondansetron (ZOFRAN) injection 4 mg (4 mg Intravenous Given 1/5/20 2152)   meclizine (ANTIVERT) tablet 25 mg (25 mg Oral Given 1/5/20 2142)   magnesium sulfate IVPB (premix) SOLN 1 g (0 g Intravenous Stopped 1/6/20 0030)       Diagnostic Studies  Results Reviewed     Procedure Component Value Units Date/Time    TSH [[id number]]  (Normal) Collected:  01/05/20 2201    Lab Status:  Final result Specimen:  Blood from Arm, Right Updated:  01/05/20 2245     TSH 3RD GENERATON 1 510 uIU/mL     Narrative:       Patients undergoing fluorescein dye angiography may retain small amounts of fluorescein in the body for 48-72 hours post procedure  Samples containing fluorescein can produce falsely depressed TSH values  If the patient had this procedure,a specimen should be resubmitted post fluorescein clearance        hCG, qualitative pregnancy [[id number]]  (Normal) Collected:  01/05/20 2201    Lab Status:  Final result Specimen:  Blood from Arm, Right Updated:  01/05/20 2236     Preg, Serum Negative    Comprehensive metabolic panel [[id number]] Collected:  01/05/20 2201    Lab Status:  Final result Specimen:  Blood from Arm, Right Updated:  01/05/20 2231     Sodium 137 mmol/L      Potassium 3 6 mmol/L      Chloride 105 mmol/L      CO2 24 mmol/L      ANION GAP 8 mmol/L      BUN 11 mg/dL      Creatinine 0 76 mg/dL      Glucose 87 mg/dL      Calcium 9 2 mg/dL      AST 13 U/L      ALT 8 U/L      Alkaline Phosphatase 52 U/L      Total Protein 7 0 g/dL      Albumin 4 0 g/dL      Total Bilirubin 0 60 mg/dL      eGFR 106 ml/min/1 73sq m     Narrative:       [place] guidelines for Chronic Kidney Disease (CKD):     Stage 1 with normal or high GFR (GFR > 90 mL/min/1 73 square meters)    Stage 2 Mild CKD (GFR = 60-89 mL/min/1 73 square meters)    Stage 3A Moderate CKD (GFR = 45-59 mL/min/1 73 square meters)    Stage 3B Moderate CKD (GFR = 30-44 mL/min/1 73 square meters)    Stage 4 Severe CKD (GFR = 15-29 mL/min/1 73 square meters)    Stage 5 End Stage CKD (GFR <15 mL/min/1 73 square meters)  Note: GFR calculation is accurate only with a steady state creatinine    CK Total with Reflex CKMB [[id number]]  (Abnormal) Collected:  01/05/20 2201    Lab Status:  Final result Specimen:  Blood from Arm, Right Updated:  01/05/20 2231     Total CK 26 U/L     Magnesium [[id number]]  (Normal) Collected:  01/05/20 2201    Lab Status:  Final result Specimen:  Blood from Arm, Right Updated:  01/05/20 2231     Magnesium 1 9 mg/dL     Protime-INR [[id number]]  (Abnormal) Collected:  01/05/20 2201    Lab Status:  Final result Specimen:  Blood from Arm, Right Updated:  01/05/20 2223     Protime 13 4 seconds      INR 1 16    APTT [[id number]]  (Normal) Collected:  01/05/20 2201    Lab Status:  Final result Specimen:  Blood from Arm, Right Updated:  01/05/20 2223     PTT 27 seconds     CBC and differential [[id number]]  (Abnormal) Collected:  01/05/20 2201    Lab Status:  Final result Specimen:  Blood from Arm, Right Updated:  01/05/20 2210     WBC 6 50 Thousand/uL      RBC 4 38 Million/uL      Hemoglobin 12 6 g/dL      Hematocrit 38 4 %      MCV 88 fL      MCH 28 8 pg      MCHC 32 8 g/dL      RDW 13 6 %      MPV 7 1 fL      Platelets 167 Thousands/uL      Neutrophils Relative 65 %      Lymphocytes Relative 25 %      Monocytes Relative 7 %      Eosinophils Relative 3 %      Basophils Relative 1 %      Neutrophils Absolute 4 20 Thousands/µL      Lymphocytes Absolute 1 60 Thousands/µL      Monocytes Absolute 0 40 Thousand/µL      Eosinophils Absolute 0 20 Thousand/µL      Basophils Absolute 0 00 Thousands/µL                  CT head without contrast   Final Result by Truman Johnson DO (01/05 2355)      No acute intracranial abnormality                    Workstation performed: [id number]                    Procedures  Procedures         ED Course  ED Course as of Jan 08 0253   Zara Alcaraz Jan 05, 2020 2235 Total CK(!): 26   2236 Magnesium: 1 9   2236 Protime(!): 13 4   2236 INR: 1 16   2236 WBC: 6 50   2236 Hemoglobin: 12 6   2236 HCT(!): 38 4   2236 Platelet Count: 033   2236 Neutrophils %: 65   2236 Lymphocytes Relative: 25   2236 Monocytes Relative: 7   2236 Eosinophils: 3   2236 PTT: 27   2236 Sodium: 137   2236 Potassium: 3 6   2236 Chloride: 105   2236 CO2: 24   2236 Anion Gap: 8   2236 BUN: 11   2236 Creatinine: 0 76   2236 Glucose, Random: 87   2236 Calcium: 9 2   2236 AST: 13   2236 ALT: 8   2236 Alkaline Phosphatase: 52   2236 Total Protein: 7 0   2302 Patient reassessed  She looks quite stable  She states she is not feeling much better but remains very well-appearing  She understands results of her workup  IV fluids and magnesium are running right now    CT brain pending      Mon Jan 06, 2020   0023 CT BRAIN - WITHOUT CONTRAST     INDICATION:   Ataxia, stroke suspected      COMPARISON:  August 30, 2017     TECHNIQUE:  CT examination of the brain was performed  In addition to axial images, coronal 2D reformatted images were created and submitted for interpretation        Radiation dose length product (DLP) for this visit:  807 1 mGy-cm   This examination, like all CT scans performed in the [facility], was performed utilizing techniques to minimize radiation dose exposure, including the use of iterative   reconstruction and automated exposure control        IMAGE QUALITY:  Diagnostic      FINDINGS:     PARENCHYMA:  No intracranial mass, mass effect or midline shift  No CT signs of acute infarction  No acute parenchymal hemorrhage      VENTRICLES AND EXTRA-AXIAL SPACES:  Normal for the patient's age      VISUALIZED ORBITS AND PARANASAL SINUSES:  Unremarkable      CALVARIUM AND EXTRACRANIAL SOFT TISSUES:  Normal      IMPRESSION:     No acute intracranial abnormality                0036 PATIENT UNDERSTANDS THE RESULTS OF HER WORKUP  SHE IS FEELING MUCH BETTER  SHE IS ABLE TO AMBULATE W/ NORMAL GAIT THROUGH THE ED    SHE IS VERY APPRECIATIVE OF HER ER CARE AND SHE IS READY TO MANAGE FROM HOME          1406   PATIENT IS AMBULATORY THROUGH THE DEPARTMENT     SHE DEMONSTRATES NORMAL AND STEADY GAIT, SHE REQUIRES NO ASSISTANCE        SHE HAS A CONTINUED NORMAL AND NONFOCAL NEUROLOGIC EXAM  Well appearing  Non toxic                                    MDM      Disposition  Final diagnoses:   Vertigo   Acute nonintractable headache, unspecified headache type     Time reflects when diagnosis was documented in both MDM as applicable and the Disposition within this note     Time User Action Codes Description Comment    1/6/2020 12:37 AM Manila Mitts Add [R42] Vertigo     1/6/2020 12:37 AM Manila Mitts Add [R51] Acute nonintractable headache, unspecified headache type       ED Disposition     ED Disposition Condition Date/Time Comment    Discharge Stable Mon Jan 6, 2020 12:37 AM Satish Maier discharge to home/self care  Follow-up Information     Follow up With Specialties Details Why Contact Info    Sherri Ribera, [facility] Internal Medicine, Nurse Practitioner Call today  [address]  [address]  [address] [place] [address]  [phone]            Discharge Medication List as of 1/6/2020 12:40 AM      START taking these medications    Details   meclizine (ANTIVERT) 25 mg tablet Take 1 tablet (25 mg total) by mouth 3 (three) times a day as needed for dizziness, Starting Mon 1/6/2020, Normal      ondansetron (ZOFRAN-ODT) 4 mg disintegrating tablet Take 1 tablet (4 mg total) by mouth every 6 (six) hours as needed for nausea or vomiting for up to 10 doses, Starting Mon 1/6/2020, Normal         CONTINUE these medications which have NOT CHANGED    Details   EPINEPHrine (EPIPEN) 0 3 mg/0 3 mL SOAJ Inject 0 3 mg into the shoulder, thigh, or buttocks once, Historical Med      norgestimate-ethinyl estradiol (ORTHO TRI-CYCLEN,TRINESSA) 0 18/0 215/0 25 MG-35 MCG per tablet take 1 tablet by mouth once daily, Normal      methylPREDNISolone 4 MG tablet therapy pack Use as directed on package, Normal           No discharge procedures on file      ED Provider  Electronically Signed by           Gui Catalan MD  01/08/20 0701

## 2020-01-17 ENCOUNTER — OFFICE VISIT (OUTPATIENT)
Dept: URGENT CARE | Facility: HOSPITAL | Age: 30
End: 2020-01-17
Payer: COMMERCIAL

## 2020-01-17 VITALS
TEMPERATURE: 98.1 F | DIASTOLIC BLOOD PRESSURE: 77 MMHG | OXYGEN SATURATION: 99 % | BODY MASS INDEX: 32.85 KG/M2 | WEIGHT: 204.4 LBS | HEIGHT: 66 IN | SYSTOLIC BLOOD PRESSURE: 116 MMHG | HEART RATE: 90 BPM | RESPIRATION RATE: 18 BRPM

## 2020-01-17 DIAGNOSIS — J02.9 SORE THROAT: ICD-10-CM

## 2020-01-17 DIAGNOSIS — J02.9 VIRAL PHARYNGITIS: Primary | ICD-10-CM

## 2020-01-17 LAB — S PYO AG THROAT QL: NEGATIVE

## 2020-01-17 PROCEDURE — 87070 CULTURE OTHR SPECIMN AEROBIC: CPT | Performed by: NURSE PRACTITIONER

## 2020-01-17 PROCEDURE — 87880 STREP A ASSAY W/OPTIC: CPT | Performed by: NURSE PRACTITIONER

## 2020-01-17 PROCEDURE — G0382 LEV 3 HOSP TYPE B ED VISIT: HCPCS | Performed by: NURSE PRACTITIONER

## 2020-01-17 PROCEDURE — S9083 URGENT CARE CENTER GLOBAL: HCPCS | Performed by: NURSE PRACTITIONER

## 2020-01-17 NOTE — PROGRESS NOTES
[facility]        NAME: Alvaro Drew is a 34 y o  female  : 1990    MRN: [id number]  DATE: 2020  TIME: 1:20 PM    Assessment and Plan   Viral pharyngitis [J02 9]  1  Viral pharyngitis  POCT rapid strepA   2  Sore throat  Throat culture       Rapid strep negative, will send culture  Patient Instructions     Patient Instructions     Drink plenty of fluids, rest, saltwater gargles, lozenges, hot tea with honey  Tylenol or motrin for pain  If you develop a prolonged high fever, difficulty breathing, swallowing, managing secretions, decreased fluid intake, or urination, any new or concerning symptoms please return or proceed ER  Recommend following up with PCP in 3-5 days  Pharyngitis   WHAT YOU NEED TO KNOW:   Pharyngitis, or sore throat, is inflammation of the tissues and structures in your pharynx (throat)  Pharyngitis is most often caused by bacteria  It may also be caused by a cold or flu virus  Other causes include smoking, allergies, or acid reflux  DISCHARGE INSTRUCTIONS:   Call 911 for any of the following:   · You have trouble breathing or swallowing because your throat is swollen or sore  Return to the emergency department if:   · You are drooling because it hurts too much to swallow  · Your fever is higher than 102? F (39?C) or lasts longer than 3 days  · You are confused  · You taste blood in your throat  Contact your healthcare provider if:   · Your throat pain gets worse  · You have a painful lump in your throat that does not go away after 5 days  · Your symptoms do not improve after 5 days  · You have questions or concerns about your condition or care  Medicines:  Viral pharyngitis will go away on its own without treatment  Your sore throat should start to feel better in 3 to 5 days for both viral and bacterial infections  You may need any of the following:  · Antibiotics  treat a bacterial infection      · NSAIDs , such as ibuprofen, help decrease swelling, pain, and fever  NSAIDs can cause stomach bleeding or kidney problems in certain people  If you take blood thinner medicine, always ask your healthcare provider if NSAIDs are safe for you  Always read the medicine label and follow directions  · Acetaminophen  decreases pain and fever  It is available without a doctor's order  Ask how much to take and how often to take it  Follow directions  Acetaminophen can cause liver damage if not taken correctly  · Take your medicine as directed  Contact your healthcare provider if you think your medicine is not helping or if you have side effects  Tell him or her if you are allergic to any medicine  Keep a list of the medicines, vitamins, and herbs you take  Include the amounts, and when and why you take them  Bring the list or the pill bottles to follow-up visits  Carry your medicine list with you in case of an emergency  Manage your symptoms:   · Gargle salt water  Mix ¼ teaspoon salt in an 8 ounce glass of warm water and gargle  This may help decrease swelling in your throat  · Drink liquids as directed  You may need to drink more liquids than usual  Liquids may help soothe your throat and prevent dehydration  Ask how much liquid to drink each day and which liquids are best for you  · Use a cool-steam humidifier  to help moisten the air in your room and calm your cough  · Soothe your throat  with cough drops, ice, soft foods, or popsicles  Prevent the spread of pharyngitis:  Cover your mouth and nose when you cough or sneeze  Do not share food or drinks  Wash your hands often  Use soap and water  If soap and water are unavailable, use an alcohol based hand   Follow up with your healthcare provider as directed:  Write down your questions so you remember to ask them during your visits     © 2017 [employer] Information is for End User's use only and may not be sold, redistributed or otherwise used for commercial purposes  All illustrations and images included in CareNotes® are the copyrighted property of [employer]  or Emmanuel Liriano  The above information is an  only  It is not intended as medical advice for individual conditions or treatments  Talk to your doctor, nurse or pharmacist before following any medical regimen to see if it is safe and effective for you  Follow up with PCP in 3-5 days  Proceed to  ER if symptoms worsen  Chief Complaint     Chief Complaint   Patient presents with    Sore Throat     started last night         History of Present Illness       Patient is a 20-year-old female who presents with a one-day history of a sore throat  Patient is complaining of pain with swallowing but denies any difficulty swallowing, breathing, or managing secretions  Patient denies any fever, chills, or body aches  Patient denies any headache, sinus pain or pressure, earache, or cough  Denies any recent sick contacts  Has not taken any over-the-counter medication  Review of Systems   Review of Systems   Constitutional: Negative for chills, diaphoresis, fatigue and fever  HENT: Positive for sore throat  Negative for congestion, ear pain, facial swelling, mouth sores, postnasal drip, rhinorrhea, sinus pressure, sinus pain and trouble swallowing  Eyes: Negative for photophobia and visual disturbance  Respiratory: Negative for cough, chest tightness and shortness of breath  Cardiovascular: Negative for chest pain and palpitations  Gastrointestinal: Negative for abdominal pain, diarrhea, nausea and vomiting  Genitourinary: Negative  Musculoskeletal: Negative for arthralgias, back pain, joint swelling, myalgias and neck stiffness  Skin: Negative for rash  Neurological: Negative for dizziness, facial asymmetry, weakness, light-headedness, numbness and headaches           Current Medications       Current Outpatient Medications:     EPINEPHrine (EPIPEN) 0 3 mg/0 3 mL SOAJ, Inject 0 3 mg into the shoulder, thigh, or buttocks once, Disp: , Rfl:     meclizine (ANTIVERT) 25 mg tablet, Take 1 tablet (25 mg total) by mouth 3 (three) times a day as needed for dizziness, Disp: 30 tablet, Rfl: 0    norgestimate-ethinyl estradiol (ORTHO TRI-CYCLEN,TRINESSA) 0 18/0 215/0 25 MG-35 MCG per tablet, take 1 tablet by mouth once daily, Disp: 84 tablet, Rfl: 3    methylPREDNISolone 4 MG tablet therapy pack, Use as directed on package (Patient not taking: Reported on 1/17/2020), Disp: 1 each, Rfl: 0    ondansetron (ZOFRAN-ODT) 4 mg disintegrating tablet, Take 1 tablet (4 mg total) by mouth every 6 (six) hours as needed for nausea or vomiting for up to 10 doses (Patient not taking: Reported on 1/17/2020), Disp: 10 tablet, Rfl: 0    Current Allergies     Allergies as of 01/17/2020 - Reviewed 01/17/2020   Allergen Reaction Noted    Shellfish-derived products Shortness Of Breath 02/22/2018            The following portions of the patient's history were reviewed and updated as appropriate: allergies, current medications, past family history, past medical history, past social history, past surgical history and problem list      Past Medical History:   Diagnosis Date    Deviated septum     Eczema     Recurrent sinus infections     Seizures ([facility] [address] )     febrile as a child "grew out of them"    Wears glasses        Past Surgical History:   Procedure Laterality Date    APPENDECTOMY      LYMPH NODE DISSECTION      "in neck completely removed age 11"   [place] NOSE SURGERY  2017    [place] NASAL/SINUS [facility] W/TOTAL ETHOIDECTOMY N/A 2/28/2018    Procedure: PARTIAL INFERIOR TURBINATE REDUCTION;  Surgeon: Mike Manzanares DO;  Location: [facility];  Service: ENT    [place] REPAIR OF NASAL SEPTUM N/A 2/28/2018    Procedure: SEPTOPLASTY WITH REMOVAL OF RIGHT FLOOR SPUR;  Surgeon:  Mike Manzanares DO;  Location: [facility];  Service: ENT    [place] STEREOTACTIC COMP ASSIST PROC,CRANIAL,EXTRADURAL N/A 2/28/2018 Procedure: FUNCTIONAL ENDOSCOPIC SINUS SURGERY (FESS) IMAGED GUIDED; Surgeon: Mike Manzanares DO;  Location: [facility];  Service: ENT       Family History   Problem Relation Age of Onset    No Known Problems Mother     No Known Problems Father          Medications have been verified  Objective   /77   Pulse 90   Temp 98 1 °F (36 7 °C) (Oral)   Resp 18   Ht 5' 6" (1 676 m)   Wt 92 7 kg (204 lb 6 4 oz)   LMP 01/09/2020 (Exact Date)   SpO2 99%   BMI 32 99 kg/m²        Physical Exam     Physical Exam   Constitutional: She is oriented to person, place, and time  She appears well-developed and well-nourished  No distress  HENT:   Head: Normocephalic and atraumatic  Right Ear: Hearing, tympanic membrane, external ear and ear canal normal    Left Ear: Hearing, tympanic membrane, external ear and ear canal normal    Nose: Nose normal  Right sinus exhibits no maxillary sinus tenderness and no frontal sinus tenderness  Left sinus exhibits no maxillary sinus tenderness and no frontal sinus tenderness  Mouth/Throat: Uvula is midline and mucous membranes are normal  Posterior oropharyngeal erythema present  No oropharyngeal exudate, posterior oropharyngeal edema or tonsillar abscesses  Tonsils are 1+ on the right  Tonsils are 1+ on the left  No tonsillar exudate  Eyes: Pupils are equal, round, and reactive to light  Conjunctivae are normal    Cardiovascular: Normal rate, regular rhythm, S1 normal, S2 normal, normal heart sounds, intact distal pulses and normal pulses  Pulmonary/Chest: Effort normal and breath sounds normal    Lymphadenopathy:     She has no cervical adenopathy  Neurological: She is alert and oriented to person, place, and time  Skin: Skin is warm and dry  Capillary refill takes less than 2 seconds

## 2020-01-17 NOTE — PATIENT INSTRUCTIONS
Ermelinda Serum Drink plenty of fluids, rest, saltwater gargles, lozenges, hot tea with honey  Tylenol or motrin for pain  If you develop a prolonged high fever, difficulty breathing, swallowing, managing secretions, decreased fluid intake, or urination, any new or concerning symptoms please return or proceed ER  Recommend following up with PCP in 3-5 days  Pharyngitis   WHAT YOU NEED TO KNOW:   Pharyngitis, or sore throat, is inflammation of the tissues and structures in your pharynx (throat)  Pharyngitis is most often caused by bacteria  It may also be caused by a cold or flu virus  Other causes include smoking, allergies, or acid reflux  DISCHARGE INSTRUCTIONS:   Call 911 for any of the following:   · You have trouble breathing or swallowing because your throat is swollen or sore  Return to the emergency department if:   · You are drooling because it hurts too much to swallow  · Your fever is higher than 102? F (39?C) or lasts longer than 3 days  · You are confused  · You taste blood in your throat  Contact your healthcare provider if:   · Your throat pain gets worse  · You have a painful lump in your throat that does not go away after 5 days  · Your symptoms do not improve after 5 days  · You have questions or concerns about your condition or care  Medicines:  Viral pharyngitis will go away on its own without treatment  Your sore throat should start to feel better in 3 to 5 days for both viral and bacterial infections  You may need any of the following:  · Antibiotics  treat a bacterial infection  · NSAIDs , such as ibuprofen, help decrease swelling, pain, and fever  NSAIDs can cause stomach bleeding or kidney problems in certain people  If you take blood thinner medicine, always ask your healthcare provider if NSAIDs are safe for you  Always read the medicine label and follow directions  · Acetaminophen  decreases pain and fever  It is available without a doctor's order   Ask how much to take and how often to take it  Follow directions  Acetaminophen can cause liver damage if not taken correctly  · Take your medicine as directed  Contact your healthcare provider if you think your medicine is not helping or if you have side effects  Tell him or her if you are allergic to any medicine  Keep a list of the medicines, vitamins, and herbs you take  Include the amounts, and when and why you take them  Bring the list or the pill bottles to follow-up visits  Carry your medicine list with you in case of an emergency  Manage your symptoms:   · Gargle salt water  Mix ¼ teaspoon salt in an 8 ounce glass of warm water and gargle  This may help decrease swelling in your throat  · Drink liquids as directed  You may need to drink more liquids than usual  Liquids may help soothe your throat and prevent dehydration  Ask how much liquid to drink each day and which liquids are best for you  · Use a cool-steam humidifier  to help moisten the air in your room and calm your cough  · Soothe your throat  with cough drops, ice, soft foods, or popsicles  Prevent the spread of pharyngitis:  Cover your mouth and nose when you cough or sneeze  Do not share food or drinks  Wash your hands often  Use soap and water  If soap and water are unavailable, use an alcohol based hand   Follow up with your healthcare provider as directed:  Write down your questions so you remember to ask them during your visits  © 2017 [address] [employer] Information is for End User's use only and may not be sold, redistributed or otherwise used for commercial purposes  All illustrations and images included in CareNotes® are the copyrighted property of [employer]  or Emmanuel Liriano  The above information is an  only  It is not intended as medical advice for individual conditions or treatments   Talk to your doctor, nurse or pharmacist before following any medical regimen to see if it is safe and effective for you

## 2020-01-19 LAB — BACTERIA THROAT CULT: NORMAL

## 2020-01-21 ENCOUNTER — OFFICE VISIT (OUTPATIENT)
Dept: FAMILY MEDICINE CLINIC | Facility: CLINIC | Age: 30
End: 2020-01-21
Payer: COMMERCIAL

## 2020-01-21 VITALS
HEART RATE: 106 BPM | WEIGHT: 203 LBS | DIASTOLIC BLOOD PRESSURE: 74 MMHG | HEIGHT: 66 IN | BODY MASS INDEX: 32.62 KG/M2 | TEMPERATURE: 98.7 F | OXYGEN SATURATION: 98 % | RESPIRATION RATE: 16 BRPM | SYSTOLIC BLOOD PRESSURE: 116 MMHG

## 2020-01-21 DIAGNOSIS — H10.33 ACUTE BACTERIAL CONJUNCTIVITIS OF BOTH EYES: ICD-10-CM

## 2020-01-21 DIAGNOSIS — E55.9 VITAMIN D DEFICIENCY: ICD-10-CM

## 2020-01-21 DIAGNOSIS — Z11.4 SCREENING FOR HIV (HUMAN IMMUNODEFICIENCY VIRUS): ICD-10-CM

## 2020-01-21 DIAGNOSIS — Z13.0 SCREENING FOR DEFICIENCY ANEMIA: ICD-10-CM

## 2020-01-21 DIAGNOSIS — Z76.89 ENCOUNTER TO ESTABLISH CARE: Primary | ICD-10-CM

## 2020-01-21 DIAGNOSIS — Z13.29 SCREENING FOR THYROID DISORDER: ICD-10-CM

## 2020-01-21 DIAGNOSIS — Z13.220 SCREENING FOR HYPERLIPIDEMIA: ICD-10-CM

## 2020-01-21 DIAGNOSIS — Z13.1 SCREENING FOR DIABETES MELLITUS: ICD-10-CM

## 2020-01-21 PROCEDURE — 3008F BODY MASS INDEX DOCD: CPT | Performed by: NURSE PRACTITIONER

## 2020-01-21 PROCEDURE — 99203 OFFICE O/P NEW LOW 30 MIN: CPT | Performed by: NURSE PRACTITIONER

## 2020-01-21 NOTE — PROGRESS NOTES
Assessment/Plan:    Problem List Items Addressed This Visit     None      Visit Diagnoses     Encounter to establish care    -  Primary    Screening for deficiency anemia        Relevant Orders    CBC and differential    Screening for hyperlipidemia        Relevant Orders    Lipid Panel with Direct LDL reflex    Screening for diabetes mellitus        Relevant Orders    Comprehensive metabolic panel    Vitamin D deficiency        Relevant Orders    Vitamin D 25 hydroxy    Screening for thyroid disorder        Relevant Orders    TSH, 3rd generation with Free T4 reflex    Screening for HIV (human immunodeficiency virus)        Relevant Orders    HIV 1/2 AG-AB combo    Acute bacterial conjunctivitis of both eyes        R>L    Relevant Medications    tobramycin (TOBREX) 0 3 % OINT           Diagnoses and all orders for this visit:    Encounter to establish care    Screening for deficiency anemia  -     CBC and differential; Future    Screening for hyperlipidemia  -     Lipid Panel with Direct LDL reflex; Future    Screening for diabetes mellitus  -     Comprehensive metabolic panel; Future    Vitamin D deficiency  -     Vitamin D 25 hydroxy; Future    Screening for thyroid disorder  -     TSH, 3rd generation with Free T4 reflex; Future    Screening for HIV (human immunodeficiency virus)  -     HIV 1/2 AG-AB combo; Future    Acute bacterial conjunctivitis of both eyes  Comments:  R>L  Orders:  -     tobramycin (TOBREX) 0 3 % OINT; Administer 0 5 inches to both eyes 3 (three) times a day        No problem-specific Assessment & Plan notes found for this encounter  Subjective:      Patient ID: Edwina Dinh is a 34 y o  female  Edwina Dinh presents today to establish care at this office and routine visit  Previous PCP Roni Orozco    Pt reports she has pink eye in right  reports foreign body sensation medially on right eye last night  She irrigated with saline solution and went to bed   Upon awaking to Pt reports crusting of the right eye with slight photophobia, redness, and drainage  Eye Problem    The right eye is affected  This is a new problem  The current episode started yesterday  The problem occurs constantly  There was no injury mechanism  The patient is experiencing no pain  Associated symptoms include blurred vision, an eye discharge, eye redness, a foreign body sensation and itching  She has tried nothing for the symptoms  The following portions of the patient's history were reviewed and updated as appropriate:   She has a past medical history of Deviated septum, Eczema, Recurrent sinus infections, Seizures ([address] ), and Wears glasses  ,  does not have any pertinent problems on file  ,   has a past surgical history that includes Nose surgery (2017); Lymph node dissection; Appendectomy; pr stereotactic comp assist proc,cranial,extradural (N/A, 2/28/2018); pr nasal/sinus ndsc w/total ethoidectomy (N/A, 2/28/2018); and pr repair of nasal septum (N/A, 2/28/2018)  ,  family history includes No Known Problems in her father and mother  ,   reports that she has never smoked  She has never used smokeless tobacco  She reports that she drank alcohol  She reports that she does not use drugs  ,  is allergic to shellfish-derived products     Current Outpatient Medications   Medication Sig Dispense Refill    EPINEPHrine (EPIPEN) 0 3 mg/0 3 mL SOAJ Inject 0 3 mg into the shoulder, thigh, or buttocks once      meclizine (ANTIVERT) 25 mg tablet Take 1 tablet (25 mg total) by mouth 3 (three) times a day as needed for dizziness 30 tablet 0    norgestimate-ethinyl estradiol (ORTHO TRI-CYCLEN,TRINESSA) 0 18/0 215/0 25 MG-35 MCG per tablet take 1 tablet by mouth once daily 84 tablet 3    tobramycin (TOBREX) 0 3 % OINT Administer 0 5 inches to both eyes 3 (three) times a day 1 Tube 0     No current facility-administered medications for this visit        PHQ-9 Depression Screening    PHQ-9:    Frequency of the following problems over the past two weeks:       Little interest or pleasure in doing things:  0 - not at all  Feeling down, depressed, or hopeless:  0 - not at all  PHQ-2 Score:  0           Review of Systems   Eyes: Positive for blurred vision, discharge, redness and itching  All other systems reviewed and are negative  Objective:  Vitals:    01/21/20 1352   BP: 116/74   Pulse: (!) 106   Resp: 16   Temp: 98 7 °F (37 1 °C)   TempSrc: Tympanic   SpO2: 98%   Weight: 92 1 kg (203 lb)   Height: 5' 6" (1 676 m)     Body mass index is 32 77 kg/m²  BMI Counseling: Body mass index is 32 77 kg/m²  Discussed the patient's BMI with her  The BMI is above normal  Nutrition recommendations include reducing portion sizes, decreasing overall calorie intake, 3-5 servings of fruits/vegetables daily, reducing fast food intake, consuming healthier snacks, decreasing soda and/or juice intake, moderation in carbohydrate intake, increasing intake of lean protein, reducing intake of saturated fat and trans fat and reducing intake of cholesterol  Exercise recommendations include vigorous aerobic physical activity for 75 minutes/week, exercising 3-5 times per week, joining a gym and strength training exercises  Physical Exam   Constitutional: She is oriented to person, place, and time  She appears well-developed and well-nourished  She is cooperative  HENT:   Head: Normocephalic and atraumatic  Right Ear: Tympanic membrane, external ear and ear canal normal    Left Ear: Tympanic membrane, external ear and ear canal normal    Nose: Nose normal    Mouth/Throat: Uvula is midline, oropharynx is clear and moist and mucous membranes are normal    Eyes: Pupils are equal, round, and reactive to light  EOM and lids are normal  Lids are everted and swept, no foreign bodies found  Right eye exhibits discharge  Right eye exhibits no hordeolum  Right conjunctiva is injected  Right eye exhibits normal extraocular motion and no nystagmus   Right pupil is round and reactive  Pupils are equal    Neck: Trachea normal, normal range of motion, full passive range of motion without pain and phonation normal  Neck supple  No JVD present  No thyroid mass and no thyromegaly present  Cardiovascular: Normal rate, regular rhythm, S1 normal, S2 normal, normal heart sounds, intact distal pulses and normal pulses  Exam reveals no gallop and no friction rub  No murmur heard  Pulmonary/Chest: Effort normal and breath sounds normal  She has no decreased breath sounds  Abdominal: Soft  Normal appearance and bowel sounds are normal  There is no hepatosplenomegaly  There is no tenderness  No hernia  Genitourinary:   Genitourinary Comments: Deferred    Musculoskeletal: Normal range of motion  Neurological: She is alert and oriented to person, place, and time  She has normal reflexes  No cranial nerve deficit  Skin: Skin is warm, dry and intact  Capillary refill takes less than 2 seconds  Psychiatric: She has a normal mood and affect  Her speech is normal and behavior is normal  Judgment and thought content normal  Cognition and memory are normal    Nursing note and vitals reviewed

## 2020-01-21 NOTE — LETTER
January 21, 2020     Patient: Max Dolan   YOB: 1990   Date of Visit: 1/21/2020       To Whom it May Concern:    Suleiman Miles is under my professional care  She was seen in my office on 1/21/2020  She may return to work on 1/23/2020  If you have any questions or concerns, please don't hesitate to call           Sincerely,          SHELDON Phelan        CC: No Recipients

## 2020-01-21 NOTE — PATIENT INSTRUCTIONS
Heart Healthy Diet   AMBULATORY CARE:   A heart healthy diet  is an eating plan low in total fat, unhealthy fats, and sodium (salt)  A heart healthy diet helps decrease your risk for heart disease and stroke  Limit the amount of fat you eat to 25% to 35% of your total daily calories  Limit sodium to less than 2,300 mg each day  Healthy fats:  Healthy fats can help improve cholesterol levels  The risk for heart disease is decreased when cholesterol levels are normal  Choose healthy fats, such as the following:  · Unsaturated fat  is found in foods such as soybean, canola, olive, corn, and safflower oils  It is also found in soft tub margarine that is made with liquid vegetable oil  · Omega-3 fat  is found in certain fish, such as salmon, tuna, and trout, and in walnuts and flaxseed  Unhealthy fats:  Unhealthy fats can cause unhealthy cholesterol levels in your blood and increase your risk of heart disease  Limit unhealthy fats, such as the following:  · Cholesterol  is found in animal foods, such as eggs and lobster, and in dairy products made from whole milk  Limit cholesterol to less than 300 milligrams (mg) each day  You may need to limit cholesterol to 200 mg each day if you have heart disease  · Saturated fat  is found in meats, such as copeland and hamburger  It is also found in chicken or turkey skin, whole milk, and butter  Limit saturated fat to less than 7% of your total daily calories  Limit saturated fat to less than 6% if you have heart disease or are at increased risk for it  · Trans fat  is found in packaged foods, such as potato chips and cookies  It is also in hard margarine, some fried foods, and shortening  Avoid trans fats as much as possible    Heart healthy foods and drinks to include:  Ask your dietitian or healthcare provider how many servings to have from each of the following food groups:  · Grains:      ¨ Whole-wheat breads, cereals, and pastas, and brown rice    ¨ Low-fat, low-sodium crackers and chips    · Vegetables:      ¨ Broccoli, green beans, green peas, and spinach    ¨ Collards, kale, and lima beans    ¨ Carrots, sweet potatoes, tomatoes, and peppers    ¨ Canned vegetables with no salt added    · Fruits:      ¨ Bananas, peaches, pears, and pineapple    ¨ Grapes, raisins, and dates    ¨ Oranges, tangerines, grapefruit, orange juice, and grapefruit juice    ¨ Apricots, mangoes, melons, and papaya    ¨ Raspberries and strawberries    ¨ Canned fruit with no added sugar    · Low-fat dairy products:      ¨ Nonfat (skim) milk, 1% milk, and low-fat almond, cashew, or soy milks fortified with calcium    ¨ Low-fat cheese, regular or frozen yogurt, and cottage cheese    · Meats and proteins , such as lean cuts of beef and pork (loin, leg, round), skinless chicken and turkey, legumes, soy products, egg whites, and nuts  Foods and drinks to limit or avoid:  Ask your dietitian or healthcare provider about these and other foods that are high in unhealthy fat, sodium, and sugar:  · Snack or packaged foods , such as frozen dinners, cookies, macaroni and cheese, and cereals with more than 300 mg of sodium per serving    · Canned or dry mixes  for cakes, soups, sauces, or gravies    · Vegetables with added sodium , such as instant potatoes, vegetables with added sauces, or regular canned vegetables    · Other foods high in sodium , such as ketchup, barbecue sauce, salad dressing, pickles, olives, soy sauce, and miso    · High-fat dairy foods  such as whole or 2% milk, cream cheese, or sour cream, and cheeses     · High-fat protein foods  such as high-fat cuts of beef (T-bone steaks, ribs), chicken or turkey with skin, and organ meats, such as liver    · Cured or smoked meats , such as hot dogs, copeland, and sausage    · Unhealthy fats and oils , such as butter, stick margarine, shortening, and cooking oils such as coconut or palm oil    · Food and drinks high in sugar , such as soft drinks (soda), sports drinks, sweetened tea, candy, cake, cookies, pies, and doughnuts  Other diet guidelines to follow:   · Eat more foods containing omega-3 fats  Eat fish high in omega-3 fats at least 2 times a week  · Limit alcohol  Too much alcohol can damage your heart and raise your blood pressure  Women should limit alcohol to 1 drink a day  Men should limit alcohol to 2 drinks a day  A drink of alcohol is 12 ounces of beer, 5 ounces of wine, or 1½ ounces of liquor  · Choose low-sodium foods  High-sodium foods can lead to high blood pressure  Add little or no salt to food you prepare  Use herbs and spices in place of salt  · Eat more fiber  to help lower cholesterol levels  Eat at least 5 servings of fruits and vegetables each day  Eat 3 ounces of whole-grain foods each day  Legumes (beans) are also a good source of fiber  Lifestyle guidelines:   · Do not smoke  Nicotine and other chemicals in cigarettes and cigars can cause lung and heart damage  Ask your healthcare provider for information if you currently smoke and need help to quit  E-cigarettes or smokeless tobacco still contain nicotine  Talk to your healthcare provider before you use these products  · Exercise regularly  to help you maintain a healthy weight and improve your blood pressure and cholesterol levels  Ask your healthcare provider about the best exercise plan for you  Do not start an exercise program without asking your healthcare provider  Follow up with your healthcare provider as directed:  Write down your questions so you remember to ask them during your visits  © 2017 [address] [employer] Information is for End User's use only and may not be sold, redistributed or otherwise used for commercial purposes  All illustrations and images included in CareNotes® are the copyrighted property of [employer]  or Emmanuel Liriano  The above information is an  only   It is not intended as medical advice for individual conditions or treatments  Talk to your doctor, nurse or pharmacist before following any medical regimen to see if it is safe and effective for you  Exercise Safety   AMBULATORY CARE:   Exercise safety  includes using correct equipment and positions to work the muscles without causing more injury  You will need to know which exercises to do and how often to do them  You will also need to know what to do if you feel pain or think an exercise caused injury  Do not start an exercise program before you talk to your healthcare provider  You may need to wait until your swelling and pain have gone down before you start to exercise  Contact your healthcare provider if:   · You have sharp pain during exercise or at rest     · You have questions or concerns about your stretches or exercises  What you need to know before you exercise:   · Exercises help lower pain and swelling after an injury or surgery  They also help strengthen the muscles that support your joints and bones  This may help prevent another injury  · You may need a light weight or an exercise band for some exercises  Your healthcare provider will tell you how much weight to exercise with  Ask your healthcare provider where to buy a weight or an exercise band  · Your healthcare provider will tell you how often to do each exercise  The provider will also tell you how many times to repeat each exercise and how many sets you should do  You may be told to do different exercises on different days  · Warm up and stretch before you exercise  Walk or ride a stationary bike for 5 to 10 minutes to help you warm up  Stretching helps increase range of motion  It may also relieve muscle soreness and help prevent another injury  Your healthcare provider will show you which stretches you need to do  What you can do to exercise safely:   · Move slowly and smoothly  Avoid fast or jerky motions  This will help prevent another injury       · Breathe normally  Do not hold your breath  It is important to breathe in and out so you do not tense up during exercise  Tension could prevent you from moving your joint in a full range of motion  · Stop if you feel sharp pain or an increase in pain  Stop the exercise and contact your healthcare provider if you have these symptoms  It is normal to feel some discomfort, such as a dull ache, during exercise  Regular exercise will help decrease your discomfort over time  Follow up with your physical therapist as directed:  Write down your questions so you remember to ask them during your visits  © 2017 [address] [facility] Information is for End User's use only and may not be sold, redistributed or otherwise used for commercial purposes  All illustrations and images included in CareNotes® are the copyrighted property of A [employer]  or Emmanuel Liriano  The above information is an  only  It is not intended as medical advice for individual conditions or treatments  Talk to your doctor, nurse or pharmacist before following any medical regimen to see if it is safe and effective for you  Conjunctivitis   AMBULATORY CARE:   Conjunctivitis,  or pink eye, is inflammation of your conjunctiva  The conjunctiva is a thin tissue that covers the front of your eye and the back of your eyelids  The conjunctiva helps protect your eye and keep it moist  Conjunctivitis may be caused by bacteria, allergies, or a virus  If your conjunctivitis is caused by bacteria, it may get better on its own in about 7 days  Viral conjunctivitis can last up to 3 weeks  Common symptoms may include any of the following: You will usually have symptoms in both eyes if your conjunctivitis is caused by allergies  You may also have other allergic symptoms, such as a rash or runny nose  Symptoms will usually start in 1 eye if your conjunctivitis is caused by a virus or bacteria    · Redness in the whites of your eye    · Itching in your eye or around your eye    · Feeling like there is something in your eye    · Watery or thick, sticky discharge    · Crusty eyelids when you wake up in the morning    · Burning, stinging, or swelling in your eye    · Pain when you see bright light  Seek care immediately if:   · You have worsening eye pain  · The swelling in your eye gets worse, even after treatment  · Your vision suddenly becomes worse or you cannot see at all  Contact your healthcare provider if:   · You develop a fever and ear pain  · You have tiny bumps or spots of blood on your eye  · You have questions or concerns about your condition or care  Treatment  will depend on the cause of your conjunctivitis  You may need antibiotics or allergy medicine as a pill, eye drop, or eye ointment  Manage your symptoms:   · Apply a cool compress  Wet a washcloth with cold water and place it on your eye  This will help decrease itching and irritation  · Do not wear contact lenses  They can irritate your eye  Throw away the pair you are using and ask when you can wear them again  Use a new pair of lenses when your healthcare provider says it is okay  · Avoid irritants  Stay away from smoke filled areas  Shield your eyes from wind and sun  · Flush your eye  You may need to flush your eye with saline to help decrease your symptoms  Ask for more information on how to flush your eye  Medicines:  Treatment depends on what is causing your conjunctivitis  You may be given any of the following:  · Allergy medicine  helps decrease itchy, red, swollen eyes caused by allergies  It may be given as a pill, eye drops, or nasal spray  · Antibiotics  may be needed if your conjunctivitis is caused by bacteria  This medicine may be given as a pill, eye drops, or eye ointment  · Take your medicine as directed  Contact your healthcare provider if you think your medicine is not helping or if you have side effects   Tell him or her if you are allergic to any medicine  Keep a list of the medicines, vitamins, and herbs you take  Include the amounts, and when and why you take them  Bring the list or the pill bottles to follow-up visits  Carry your medicine list with you in case of an emergency  Prevent the spread of conjunctivitis:   · Wash your hands with soap and water often  Wash your hands before and after you touch your eyes  Also wash your hands before you prepare or eat food and after you use the bathroom or change a diaper  · Avoid allergens  Try to avoid the things that cause your allergies, such as pets, dust, or grass  · Avoid contact with others  Do not share towels or washcloths  Try to stay away from others as much as possible  Ask when you can return to work or school  · Throw away eye makeup  The bacteria that caused your conjunctivitis can stay in eye makeup  Throw away mascara and other eye makeup  © 2017 [address] [facility] Information is for End User's use only and may not be sold, redistributed or otherwise used for commercial purposes  All illustrations and images included in CareNotes® are the copyrighted property of [employer]  or Emmanuel Liriano  The above information is an  only  It is not intended as medical advice for individual conditions or treatments  Talk to your doctor, nurse or pharmacist before following any medical regimen to see if it is safe and effective for you

## 2020-02-01 ENCOUNTER — TRANSCRIBE ORDERS (OUTPATIENT)
Dept: LAB | Facility: HOSPITAL | Age: 30
End: 2020-02-01

## 2020-02-01 ENCOUNTER — APPOINTMENT (OUTPATIENT)
Dept: LAB | Facility: HOSPITAL | Age: 30
End: 2020-02-01
Payer: COMMERCIAL

## 2020-02-01 DIAGNOSIS — Z13.1 SCREENING FOR DIABETES MELLITUS: ICD-10-CM

## 2020-02-01 DIAGNOSIS — Z13.220 SCREENING FOR HYPERLIPIDEMIA: ICD-10-CM

## 2020-02-01 DIAGNOSIS — Z13.0 SCREENING FOR DEFICIENCY ANEMIA: ICD-10-CM

## 2020-02-01 DIAGNOSIS — Z13.29 SCREENING FOR THYROID DISORDER: ICD-10-CM

## 2020-02-01 DIAGNOSIS — Z11.4 SCREENING FOR HIV (HUMAN IMMUNODEFICIENCY VIRUS): ICD-10-CM

## 2020-02-01 DIAGNOSIS — E55.9 VITAMIN D DEFICIENCY: ICD-10-CM

## 2020-02-01 LAB
25(OH)D3 SERPL-MCNC: 18.2 NG/ML (ref 30–100)
ALBUMIN SERPL BCP-MCNC: 4 G/DL (ref 3.5–5.7)
ALP SERPL-CCNC: 54 U/L (ref 40–150)
ALT SERPL W P-5'-P-CCNC: 9 U/L (ref 7–52)
ANION GAP SERPL CALCULATED.3IONS-SCNC: 9 MMOL/L (ref 4–13)
AST SERPL W P-5'-P-CCNC: 13 U/L (ref 13–39)
BASOPHILS # BLD AUTO: 0.1 THOUSANDS/ΜL (ref 0–0.1)
BASOPHILS NFR BLD AUTO: 1 % (ref 0–2)
BILIRUB SERPL-MCNC: 0.5 MG/DL (ref 0.2–1)
BUN SERPL-MCNC: 14 MG/DL (ref 7–25)
CALCIUM SERPL-MCNC: 9 MG/DL (ref 8.6–10.5)
CHLORIDE SERPL-SCNC: 105 MMOL/L (ref 98–107)
CHOLEST SERPL-MCNC: 257 MG/DL (ref 0–200)
CO2 SERPL-SCNC: 24 MMOL/L (ref 21–31)
CREAT SERPL-MCNC: 0.79 MG/DL (ref 0.6–1.2)
EOSINOPHIL # BLD AUTO: 0.3 THOUSAND/ΜL (ref 0–0.61)
EOSINOPHIL NFR BLD AUTO: 5 % (ref 0–5)
ERYTHROCYTE [DISTWIDTH] IN BLOOD BY AUTOMATED COUNT: 13.4 % (ref 11.5–14.5)
GFR SERPL CREATININE-BSD FRML MDRD: 101 ML/MIN/1.73SQ M
GLUCOSE P FAST SERPL-MCNC: 78 MG/DL (ref 65–99)
HCT VFR BLD AUTO: 38.2 % (ref 42–47)
HDLC SERPL-MCNC: 51 MG/DL
HGB BLD-MCNC: 12.5 G/DL (ref 12–16)
LDLC SERPL CALC-MCNC: 171 MG/DL (ref 0–100)
LYMPHOCYTES # BLD AUTO: 2 THOUSANDS/ΜL (ref 0.6–4.47)
LYMPHOCYTES NFR BLD AUTO: 29 % (ref 21–51)
MCH RBC QN AUTO: 28.7 PG (ref 26–34)
MCHC RBC AUTO-ENTMCNC: 32.7 G/DL (ref 31–37)
MCV RBC AUTO: 88 FL (ref 81–99)
MONOCYTES # BLD AUTO: 0.4 THOUSAND/ΜL (ref 0.17–1.22)
MONOCYTES NFR BLD AUTO: 6 % (ref 2–12)
NEUTROPHILS # BLD AUTO: 4.2 THOUSANDS/ΜL (ref 1.4–6.5)
NEUTS SEG NFR BLD AUTO: 60 % (ref 42–75)
PLATELET # BLD AUTO: 343 THOUSANDS/UL (ref 149–390)
PMV BLD AUTO: 7.2 FL (ref 8.6–11.7)
POTASSIUM SERPL-SCNC: 3.5 MMOL/L (ref 3.5–5.5)
PROT SERPL-MCNC: 7.2 G/DL (ref 6.4–8.9)
RBC # BLD AUTO: 4.35 MILLION/UL (ref 3.9–5.2)
SODIUM SERPL-SCNC: 138 MMOL/L (ref 134–143)
TRIGL SERPL-MCNC: 176 MG/DL (ref 44–166)
TSH SERPL DL<=0.05 MIU/L-ACNC: 2.62 UIU/ML (ref 0.45–5.33)
WBC # BLD AUTO: 7.1 THOUSAND/UL (ref 4.8–10.8)

## 2020-02-01 PROCEDURE — 36415 COLL VENOUS BLD VENIPUNCTURE: CPT

## 2020-02-01 PROCEDURE — 84443 ASSAY THYROID STIM HORMONE: CPT

## 2020-02-01 PROCEDURE — 80053 COMPREHEN METABOLIC PANEL: CPT

## 2020-02-01 PROCEDURE — 85025 COMPLETE CBC W/AUTO DIFF WBC: CPT

## 2020-02-01 PROCEDURE — 80061 LIPID PANEL: CPT

## 2020-02-01 PROCEDURE — 87389 HIV-1 AG W/HIV-1&-2 AB AG IA: CPT

## 2020-02-01 PROCEDURE — 82306 VITAMIN D 25 HYDROXY: CPT

## 2020-02-04 PROBLEM — E55.9 VITAMIN D DEFICIENCY: Status: ACTIVE | Noted: 2020-02-04

## 2020-02-04 NOTE — PROGRESS NOTES
Assessment/Plan:    Problem List Items Addressed This Visit     Vitamin D deficiency - Primary    Relevant Medications    ergocalciferol (VITAMIN D2) 50,000 units           Diagnoses and all orders for this visit:    Vitamin D deficiency  -     ergocalciferol (VITAMIN D2) 50,000 units; Take 1 capsule (50,000 Units total) by mouth once a week        No problem-specific Assessment & Plan notes found for this encounter  Subjective:      Patient ID: Duwaine Dose is a 34 y o  female  Duwaine Dose is here to discuss serology result from 2/1/2020    Pt doing well overall, offers no acute complaints today  The following portions of the patient's history were reviewed and updated as appropriate:   She has a past medical history of Deviated septum, Eczema, Recurrent sinus infections, Seizures ([address] ), and Wears glasses  ,  does not have any pertinent problems on file  ,   has a past surgical history that includes Nose surgery (2017); Lymph node dissection; Appendectomy; pr stereotactic comp assist proc,cranial,extradural (N/A, 2/28/2018); pr nasal/sinus ndsc w/total ethoidectomy (N/A, 2/28/2018); and pr repair of nasal septum (N/A, 2/28/2018)  ,  family history includes No Known Problems in her father and mother  ,   reports that she has never smoked  She has never used smokeless tobacco  She reports that she drank alcohol  She reports that she does not use drugs  ,  is allergic to shellfish-derived products     Current Outpatient Medications   Medication Sig Dispense Refill    norgestimate-ethinyl estradiol (ORTHO TRI-CYCLEN,TRINESSA) 0 18/0 215/0 25 MG-35 MCG per tablet take 1 tablet by mouth once daily 84 tablet 3    EPINEPHrine (EPIPEN) 0 3 mg/0 3 mL SOAJ Inject 0 3 mg into the shoulder, thigh, or buttocks once      ergocalciferol (VITAMIN D2) 50,000 units Take 1 capsule (50,000 Units total) by mouth once a week 4 capsule 5    meclizine (ANTIVERT) 25 mg tablet Take 1 tablet (25 mg total) by mouth 3 (three) times a day as needed for dizziness (Patient not taking: Reported on 2/5/2020) 30 tablet 0     No current facility-administered medications for this visit  Review of Systems   Constitutional: Negative  HENT: Negative  Eyes: Negative  Respiratory: Negative  Cardiovascular: Negative  Gastrointestinal: Negative  Endocrine: Negative  Genitourinary: Negative  Musculoskeletal: Negative  Skin: Negative  Allergic/Immunologic: Negative  Neurological: Negative  Hematological: Negative  Psychiatric/Behavioral: Negative  Objective:  Vitals:    02/05/20 1454   BP: 104/78   BP Location: Left arm   Patient Position: Sitting   Cuff Size: Large   Pulse: 89   Temp: 98 3 °F (36 8 °C)   TempSrc: Tympanic   SpO2: 99%   Weight: 92 4 kg (203 lb 9 6 oz)   Height: 5' 6" (1 676 m)     Body mass index is 32 86 kg/m²  BMI Counseling: Body mass index is 32 86 kg/m²  Discussed the patient's BMI with her  The BMI is above normal  Nutrition recommendations include reducing portion sizes, decreasing overall calorie intake, 3-5 servings of fruits/vegetables daily, reducing fast food intake, consuming healthier snacks, decreasing soda and/or juice intake, moderation in carbohydrate intake, increasing intake of lean protein, reducing intake of saturated fat and trans fat and reducing intake of cholesterol  Exercise recommendations include vigorous aerobic physical activity for 75 minutes/week, exercising 3-5 times per week and strength training exercises  Physical Exam   Constitutional: She is oriented to person, place, and time  She appears well-developed and well-nourished  She is cooperative  HENT:   Head: Normocephalic and atraumatic     Right Ear: Tympanic membrane, external ear and ear canal normal    Left Ear: Tympanic membrane, external ear and ear canal normal    Nose: Nose normal    Mouth/Throat: Uvula is midline, oropharynx is clear and moist and mucous membranes are normal    Eyes: Pupils are equal, round, and reactive to light  Conjunctivae, EOM and lids are normal    Neck: Trachea normal, normal range of motion, full passive range of motion without pain and phonation normal  Neck supple  No JVD present  No thyroid mass and no thyromegaly present  Cardiovascular: Normal rate, regular rhythm, S1 normal, S2 normal, normal heart sounds, intact distal pulses and normal pulses  Exam reveals no gallop and no friction rub  No murmur heard  Pulmonary/Chest: Effort normal and breath sounds normal  She has no decreased breath sounds  Abdominal: Soft  Normal appearance and bowel sounds are normal  There is no hepatosplenomegaly  There is no tenderness  No hernia  Genitourinary:   Genitourinary Comments: Deferred    Musculoskeletal: Normal range of motion  Neurological: She is alert and oriented to person, place, and time  She has normal reflexes  No cranial nerve deficit  Skin: Skin is warm, dry and intact  Capillary refill takes less than 2 seconds  Psychiatric: She has a normal mood and affect  Her speech is normal and behavior is normal  Judgment and thought content normal  Cognition and memory are normal    Nursing note and vitals reviewed  Appointment on 02/01/2020   Component Date Value Ref Range Status    Cholesterol 02/01/2020 257* 0 - 200 mg/dL Final      Cholesterol:       Desirable         <200 mg/dl       Borderline         200-239 mg/dl       High              >239           Triglycerides 02/01/2020 176* 44 - 166 mg/dL Final      Triglyceride:     Normal          <150 mg/dl     Borderline High 150-199 mg/dl     High            200-499 mg/dl        Very High       >499 mg/dl    Specimen collection should occur prior to N-Acetylcysteine or Metamizole administration due to the potential for falsely depressed results      HDL, Direct 02/01/2020 51  >=40 mg/dL Final      HDL Cholesterol:       Low     <41 mg/dL  Specimen collection should occur prior to Metamizole administration due to the potential for falsley depressed results   LDL Calculated 02/01/2020 171* 0 - 100 mg/dL Final      LDL Cholesterol:     Optimal           <100 mg/dl     Near Optimal      100-129 mg/dl     Above Optimal       Borderline High 130-159 mg/dl       High            160-189 mg/dl       Very High       >189 mg/dl         This screening LDL is a calculated result  It does not have the accuracy of the Direct Measured LDL in the monitoring of patients with hyperlipidemia and/or statin therapy  Direct Measure LDL (BDN374) must be ordered separately in these patients      WBC 02/01/2020 7 10  4 80 - 10 80 Thousand/uL Final    RBC 02/01/2020 4 35  3 90 - 5 20 Million/uL Final    Hemoglobin 02/01/2020 12 5  12 0 - 16 0 g/dL Final    Hematocrit 02/01/2020 38 2* 42 0 - 47 0 % Final    MCV 02/01/2020 88  81 - 99 fL Final    MCH 02/01/2020 28 7  26 0 - 34 0 pg Final    MCHC 02/01/2020 32 7  31 0 - 37 0 g/dL Final    RDW 02/01/2020 13 4  11 5 - 14 5 % Final    MPV 02/01/2020 7 2* 8 6 - 11 7 fL Final    Platelets 10/69/9798 343  149 - 390 Thousands/uL Final    Neutrophils Relative 02/01/2020 60  42 - 75 % Final    Lymphocytes Relative 02/01/2020 29  21 - 51 % Final    Monocytes Relative 02/01/2020 6  2 - 12 % Final    Eosinophils Relative 02/01/2020 5  0 - 5 % Final    Basophils Relative 02/01/2020 1  0 - 2 % Final    Neutrophils Absolute 02/01/2020 4 20  1 40 - 6 50 Thousands/µL Final    Lymphocytes Absolute 02/01/2020 2 00  0 60 - 4 47 Thousands/µL Final    Monocytes Absolute 02/01/2020 0 40  0 17 - 1 22 Thousand/µL Final    Eosinophils Absolute 02/01/2020 0 30  0 00 - 0 61 Thousand/µL Final    Basophils Absolute 02/01/2020 0 10  0 00 - 0 10 Thousands/µL Final    Sodium 02/01/2020 138  134 - 143 mmol/L Final    Potassium 02/01/2020 3 5  3 5 - 5 5 mmol/L Final    Chloride 02/01/2020 105  98 - 107 mmol/L Final    CO2 02/01/2020 24  21 - 31 mmol/L Final    ANION GAP 02/01/2020 9  4 - 13 mmol/L Final    BUN 02/01/2020 14  7 - 25 mg/dL Final    Creatinine 02/01/2020 0 79  0 60 - 1 20 mg/dL Final    Standardized to IDMS reference method    Glucose, Fasting 02/01/2020 78  65 - 99 mg/dL Final      Specimen collection should occur prior to Sulfasalazine administration due to the potential for falsely depressed results  Specimen collection should occur prior to Sulfapyridine administration due to the potential for falsely elevated results   Calcium 02/01/2020 9 0  8 6 - 10 5 mg/dL Final    AST 02/01/2020 13  13 - 39 U/L Final      Specimen collection should occur prior to Sulfasalazine administration due to the potential for falsely depressed results   ALT 02/01/2020 9  7 - 52 U/L Final      Specimen collection should occur prior to Sulfasalazine administration due to the potential for falsely depressed results   Alkaline Phosphatase 02/01/2020 54  40 - 150 U/L Final    Total Protein 02/01/2020 7 2  6 4 - 8 9 g/dL Final    Albumin 02/01/2020 4 0  3 5 - 5 7 g/dL Final    Total Bilirubin 02/01/2020 0 50  0 20 - 1 00 mg/dL Final    eGFR 02/01/2020 101  ml/min/1 73sq m Final    Vit D, 25-Hydroxy 02/01/2020 18 2* 30 0 - 100 0 ng/mL Final    TSH 3RD GENERATON 02/01/2020 2 620  0 450 - 5 330 uIU/mL Final      The recommended reference ranges for TSH during pregnancy are as follows:   First trimester 0 1 to 2 5 uIU/mL   Second trimester  0 2 to 3 0 uIU/mL   Third trimester 0 3 to 3 0 uIU/m    Note: Normal ranges may not apply to patients who are transgender, non-binary, or whose legal sex, sex at birth, and gender identity differ  Using supplements with high doses of biotin 20 to more than 300 times greater than the adequate daily intake for adults of 30 mcg/day as established by the [place] [employer], can cause falsely depress results   HIV-1/HIV-2 Ab 02/01/2020 Non-Reactive  Non-Reactive Final     I have reviewed all the lab results   There are some abnormalities that are not critical to the patient's health, I have discussed these in person at this office appointment  Elevated LDL, total cholesterol, & TG - recommend lifestyle and dietary changes which include plant based diet with white lean meats, no frying, EVOO on vegetables instead of butter, nuts, fruits, and hydration with water with a regular moderate exercise regimen 20 to 30 minutes three times a week  The ASCVD Risk score (Jayleen Veliz , et al , 2013) failed to calculate for the following reasons: The 2013 ASCVD risk score is only valid for ages 36 to 78    Vitamin D is low, will send in script  Take 1 tab per week per orders  Consider taking OTC vitamin D 2000 IU daily once the prescription is completed

## 2020-02-04 NOTE — PATIENT INSTRUCTIONS
Vitamin D Deficiency   AMBULATORY CARE:   Vitamin D deficiency  is a low level of vitamin D in your body  Vitamin D helps your body absorb calcium from foods  Your body makes vitamin D when your skin is exposed to sunlight  You can also get vitamin D from certain foods such as fish, eggs, and meat  Most of the vitamin D in your body comes from sunlight exposure  Common symptoms include the following:  Low levels of vitamin D can lead to weak and brittle bones that are more likely to fracture  You may not have any signs and symptoms, or you may have any of the following:  · Bone pain or discomfort in your lower back, pelvis, or legs    · Muscle aches and weakness    · Low back pain in women    · Poor growth, irritability, and frequent respiratory tract infections in infants    · Deformed bones and slow growth in children  Contact your healthcare provider for the following symptoms:   · Continued or worsening symptoms    · Nausea, vomiting, or a headache after possibly taking too much of a vitamin D supplement    · Questions or concerns about your condition or care  Treatment for vitamin D deficiency  includes high doses of vitamin D for 8 to 12 weeks to increase your levels  Your levels will then be rechecked  If your levels are still low, you will need to take vitamin D supplements for another 8 weeks  After your levels have gone back to normal, you may need to continue to take a vitamin D supplement  Amount of vitamin D do you need each day:  The amount of vitamin D you need depends on your age  You may need more than the recommended amounts below if you take certain medicines or you are obese  Ask your healthcare provider how much vitamin D you need  · Infants up to 1 year of age: 0 international units (IU)    · Children 1 year and older: 600 IU    · Adults aged 23to 79years old: 600 IU    · Adults older than 70 years: 800 IU  Prevent vitamin D deficiency:   · Eat foods that are high in vitamin D    Fatty fish such as mackerel, canned tuna and sardines, and salmon are good sources of vitamin D  Certain foods such as milk, juice, and cereal are fortified with vitamin D      · Give your  infant a vitamin D supplement  of 400 IU each day  · Take vitamin D supplements as directed  High doses of vitamin D can be toxic  Your healthcare provider will tell you how much vitamin D you should take each day  Vitamin D is best absorbed when taken with food  · Expose your skin to sunlight as directed  Ask your healthcare provider how you can safely expose your skin to sunlight and for how long  Too much exposure to sunlight can cause skin cancer  Follow up with your healthcare provider as directed:  Write down your questions so you remember to ask them during your visits  © 2017 [address] [facility] Information is for End User's use only and may not be sold, redistributed or otherwise used for commercial purposes  All illustrations and images included in CareNotes® are the copyrighted property of [employer] M [employer]  or Emmanuel Heri  The above information is an  only  It is not intended as medical advice for individual conditions or treatments  Talk to your doctor, nurse or pharmacist before following any medical regimen to see if it is safe and effective for you  Lipid Profile   AMBULATORY CARE:   A lipid profile,  or lipid panel, is a blood test to check your lipid levels  Lipids are fats that cannot dissolve in blood  High lipid levels increase your risk for heart disease and a heart attack or stroke  A lipid profile includes the following:  · Total cholesterol  is the main number used for cholesterol values  ¨ Goal: Less than 200 mg/dL    ¨ Borderline high: 200 to 239 mg/dL    ¨ High: 240 mg/dL or higher    · LDL (bad) cholesterol  carries cholesterol and deposits it in the arteries  This can cause a blockage      ¨ Goal: 100 mg/dL or lower    ¨ Near goal: 100 to 129 mg/dL ¨ Borderline high: 130 to 159 mg/dL    ¨ High: 160 to 189 mg/dL    ¨ Very high: 190 mg/dL or higher    · HDL (good) cholesterol  removes cholesterol from your body  ¨ Goal: 60 mg/dL or higher    ¨ Borderline risk: 40 to 59 mg/dL    ¨ High risk: 40 mg/dL or lower    · Triglycerides  are a different kind of fat than cholesterol  ¨ Goal: 150 mg/dL or lower    ¨ Borderline high: 150 to 199 mg/dL    ¨ High: 200 to 499 mg/dL    ¨ Very high: 500 mg/dL or higher  How to prepare for the test:  Do not eat or drink anything, except water, for 12 to 14 hours before the test  Ask your healthcare provider if you should take your medicines on the day of your test    What you need to know about your test results: Your healthcare provider will discuss your test results with you  If your test results are abnormal, you may need treatment to decrease your risk for heart disease  © 2017 [address] [facility] Information is for End User's use only and may not be sold, redistributed or otherwise used for commercial purposes  All illustrations and images included in CareNotes® are the copyrighted property of A D [employer] M [employer]  or Emmanuel Liriano  The above information is an  only  It is not intended as medical advice for individual conditions or treatments  Talk to your doctor, nurse or pharmacist before following any medical regimen to see if it is safe and effective for you  Cholesterol and Your Health   AMBULATORY CARE:   Cholesterol  is a waxy, fat-like substance  Cholesterol is made by your body, but also comes from certain foods you eat  Your body uses cholesterol to make hormones and new cells  Your body also uses cholesterol to protect nerves  Cholesterol comes from foods such as meat and dairy products  Your total cholesterol level is made up by LDL cholesterol, HDL cholesterol, and triglycerides:  · LDL cholesterol  is called bad cholesterol  because it forms plaque in your arteries   As plaque builds up, your arteries become narrow, and less blood flows through  When plaque decreases blood flow to your heart, you may have chest pain  If plaque completely blocks an artery that bring blood to your heart, you may have a heart attack  Plaque can break off and form blood clots  Blood clots may block arteries in your brain and cause a stroke  · HDL cholesterol  is called good cholesterol  because it helps remove LDL cholesterol from your arteries  It does this by attaching to LDL cholesterol and carrying it to your liver  Your liver breaks down LDL cholesterol so your body can get rid of it  High levels of HDL cholesterol can help prevent a heart attack and stroke  Low levels of HDL cholesterol can increase your risk for heart disease, heart attack, and stroke  · Triglycerides  are a type of fat that store energy from foods you eat  High levels of triglycerides also cause plaque buildup  This can increase your risk for a heart attack or stroke  If your triglyceride level is high, your LDL cholesterol level may also be high  How food affects your cholesterol levels:   · Unhealthy fats  increase LDL cholesterol and triglyceride levels in your blood  They are found in foods high in cholesterol, saturated fat, and trans fat:     ¨ Cholesterol  is found in eggs, dairy, and meat  ¨ Saturated fat  is found in butter, cheese, ice cream, whole milk, and coconut oil  Saturated fat is also found in meat, such as sausage, hot dogs, and bologna  ¨ Trans fat  is found in liquid oils and is used in fried and baked foods  Foods that contain trans fats include chips, crackers, muffins, sweet rolls, microwave popcorn, and cookies  · Healthy fats,  also called unsaturated fats, help lower LDL cholesterol and triglyceride levels  Healthy fats include the following:     ¨ Monounsaturated fats  are found in foods such as olive oil, canola oil, avocado, nuts, and olives      ¨ Polyunsaturated fats, such as omega 3 fats, are found in fish, such as salmon, trout, and tuna  They can also be found in plant foods such as flaxseed, walnuts, and soybeans  Other things that affect your cholesterol levels:   · Smoking cigarettes    · Being overweight or obese     · Drinking large amounts of alcohol    · Not enough exercise or no exercise    · Certain genes passed from your parents to you  What you need to know about having your cholesterol levels checked: Adults 21to 39years of age should have their cholesterol levels checked every 4 to 6 years  Adults 45 years and older should have their cholesterol checked every 1 to 2 years  You may need your cholesterol checked more often, or at a younger age, if you have risk factors for heart disease  You may also need to have your cholesterol checked more often if you have other health conditions, such as diabetes  Blood tests are used to check cholesterol levels  Blood tests measure your levels of triglycerides, LDL cholesterol, and HDL cholesterol  Cholesterol level goals: Your cholesterol level goal may depend on your risk for heart disease  It may also depend on your age and other health conditions  Ask your healthcare provider if the following goals are right for you:  · Your total cholesterol level  should be less than 200 mg/dL  This number may also depend on your HDL and LDL cholesterol goals  · Your LDL cholesterol level  should be less than 130 mg/dL  · Your HDL cholesterol level  should be 60 mg/dL or higher  · Your triglyceride level  should be less than 150 mg/dL  Treatment for high cholesterol:  Treatment for high cholesterol will also decrease your risk of heart disease, heart attack, and stroke  Treatment may include any of the following:  · Medicines  may be given to lower your LDL cholesterol, triglyceride levels, or total cholesterol level   You may need medicines to lower your cholesterol if any of the following is true:     ¨ You have a history of stroke, TIA, unstable angina, or a heart attack    ¨ Your LDL cholesterol level is 190 mg/dL or higher    ¨ You are age 36to 76years of age, have diabetes, and your LDL cholesterol is 70 mg/dL or higher    ¨ You are age 36to 76years of age, have risk factors for heart disease, and your LDL cholesterol is 70 mg/dL or higher    · Lifestyle changes  include changes to your diet, exercise, weight loss, and quitting smoking  It also includes decreasing the amount of alcohol you drink  · Supplements  include fish oil, red yeast rice, and garlic  Fish oil may help lower your triglyceride and LDL cholesterol levels  It may also increase your HDL cholesterol level  Red yeast rice may help decrease your total cholesterol level and LDL cholesterol level  Garlic may help lower your total cholesterol level  Do not take these supplements without talking to your healthcare provider  Nutrition to help lower your cholesterol levels:  A registered dietitian can help you create a healthy eating plan  Read food labels and choose foods low in saturated fat, trans fats, and cholesterol  · Decrease the total amount of fat you eat  Choose lean meats, fat-free or 1% fat milk, and low-fat dairy products, such as yogurt and cheese  Try to limit or avoid red meats  Limit or do not eat fried foods or baked goods such as cookies  · Replace unhealthy fats with healthy fats  Cook foods in olive oil or canola oil  Choose soft margarines that are low in saturated fat and trans fat  Seeds, nuts, and avocados are other examples of healthy fats  · Eat foods with omega-3 fats  Examples include salmon, tuna, mackerel, walnuts, and flaxseed  Eat fish 2 times per week  Children and pregnant women should not eat fish that have high levels of mercury, such as shark, swordfish, and snehal mackerel  · Increase the amount of plant-based foods you eat  Plant-based foods are low in cholesterol and fat   Eating more of these foods may help lower your cholesterol and help you lose weight  Examples of plant-based foods includes fruits, vegetables, legumes, and whole grains  Replace milk that contains dairy with almond, soy, or coconut milk  Eat beans and foods with soy for protein instead of meat  Ask your healthcare provider or dietitian for more information on plant-based foods  · Increase the amount of fiber you eat  High-fiber foods can help lower your LDL cholesterol  You should eat between 20 and 30 grams of fiber each day  Eat at least 5 servings of fruits and vegetables each day  Other examples of high-fiber foods include whole-grain or whole-wheat breads, pastas, or cereals, and brown rice  Eat 3 ounces of whole-grain foods each day  Increase fiber slowly  You may have abdominal discomfort, bloating, and gas if you add fiber to your diet too quickly  Lifestyle changes you can make to help lower your cholesterol levels:   · Maintain a healthy weight  Ask your healthcare provider how much you should weigh  Ask him or her to help you create a weight loss plan if you are overweight  Weight loss can decrease your total cholesterol and triglyceride levels  · Exercise regularly  Exercise can help lower your total cholesterol level and maintain a healthy weight  Exercise can also help increase your HDL cholesterol level  Work with your healthcare provider to create an exercise program that is right for you  Get at least 30 minutes of moderate exercise most days of the week  Examples of exercise include brisk walking, swimming, or biking  · Do not smoke  Nicotine and other chemicals in cigarettes and cigars can damage your lungs, heart, and blood vessels  They can also raise your triglyceride levels  Ask your healthcare provider for information if you currently smoke and need help to quit  E-cigarettes or smokeless tobacco still contain nicotine  Talk to your healthcare provider before you use these products      · Limit or do not drink alcohol  Alcohol can increase your triglyceride levels  Ask your healthcare provider if it is safe for you to drink alcohol  Also ask how much is safe for you to drink each day  © 2017 [address] [employer]  Information is for End User's use only and may not be sold, redistributed or otherwise used for commercial purposes  All illustrations and images included in CareNotes® are the copyrighted property of [employer]  or Emmanuel Liriano  The above information is an  only  It is not intended as medical advice for individual conditions or treatments  Talk to your doctor, nurse or pharmacist before following any medical regimen to see if it is safe and effective for you  Low Fat Diet   AMBULATORY CARE:   A low-fat diet  is an eating plan that is low in total fat, unhealthy fat, and cholesterol  You may need to follow a low-fat diet if you have trouble digesting or absorbing fat  You may also need to follow this diet if you have high cholesterol  You can also lower your cholesterol by increasing the amount of fiber in your diet  Soluble fiber is a type of fiber that helps to decrease cholesterol levels  Different types of fat in food:   · Limit unhealthy fats  A diet that is high in cholesterol, saturated fat, and trans fat may cause unhealthy cholesterol levels  Unhealthy cholesterol levels increase your risk of heart disease  ¨ Cholesterol:  Limit intake of cholesterol to less than 200 mg per day  Cholesterol is found in meat, eggs, and dairy  ¨ Saturated fat:  Limit saturated fat to less than 7% of your total daily calories  Ask your dietitian how many calories you need each day  Saturated fat is found in butter, cheese, ice cream, whole milk, and palm oil  Saturated fat is also found in meat, such as beef, pork, chicken skin, and processed meats  Processed meats include sausage, hot dogs, and bologna  ¨ Trans fat:  Avoid trans fat as much as possible   Trans fat is used in fried and baked foods  Foods that say trans fat free on the label may still have up to 0 5 grams of trans fat per serving  · Include healthy fats  Replace foods that are high in saturated and trans fat with foods high in healthy fats  This may help to decrease high cholesterol levels  ¨ Monounsaturated fats: These are found in avocados, nuts, and vegetable oils, such as olive, canola, and sunflower oil  ¨ Polyunsaturated fats: These can be found in vegetable oils, such as soybean or corn oil  Omega-3 fats can help to decrease the risk of heart disease  Omega-3 fats are found in fish, such as salmon, herring, trout, and tuna  Omega-3 fats can also be found in plant foods, such as walnuts, flaxseed, soybeans, and canola oil    Foods to limit or avoid:   · Grains:      ¨ Snacks that are made with partially hydrogenated oils, such as chips, regular crackers, and butter-flavored popcorn    ¨ High-fat baked goods, such as biscuits, croissants, doughnuts, pies, cookies, and pastries    · Dairy:      ¨ Whole milk, 2% milk, and yogurt and ice cream made with whole milk    ¨ Half and half creamer, heavy cream, and whipping cream    ¨ Cheese, cream cheese, and sour cream    · Meats and proteins:      ¨ High-fat cuts of meat (T-bone steak, regular hamburger, and ribs)    ¨ Fried meat, poultry (turkey and chicken), and fish    ¨ Poultry (chicken and turkey) with skin    ¨ Cold cuts (salami or bologna), hot dogs, [place], and sausage    ¨ Whole eggs and egg yolks    · Vegetables and fruits with added fat:      ¨ Fried vegetables or vegetables in butter or high-fat sauces, such as cream or cheese sauces    ¨ Fried fruit or fruit served with butter or cream    · Fats:      ¨ Butter, stick margarine, and shortening    ¨ Coconut, palm oil, and palm kernel oil  Foods to include:   · Grains:      ¨ Whole-grain breads, cereals, pasta, and brown rice    ¨ Low-fat crackers and pretzels    · Vegetables and fruits: ¨ Fresh, frozen, or canned vegetables (no salt or low-sodium)    ¨ Fresh, frozen, dried, or canned fruit (canned in light syrup or fruit juice)    ¨ Avocado    · Low-fat dairy products:      ¨ Nonfat (skim) or 1% milk    ¨ Nonfat or low-fat cheese, yogurt, and cottage cheese    · Meats and proteins:      ¨ Chicken or turkey with no skin    ¨ Baked or broiled fish    ¨ Lean beef and pork (loin, round, extra lean hamburger)    ¨ Beans and peas, unsalted nuts, soy products    ¨ Egg whites and substitutes    ¨ Seeds and nuts    · Fats:      ¨ Unsaturated oil, such as canola, olive, peanut, soybean, or sunflower oil    ¨ Soft or liquid margarine and vegetable oil spread    ¨ Low-fat salad dressing  Other ways to decrease fat:   · Read food labels before you buy foods  Choose foods that have less than 30% of calories from fat  Choose low-fat or fat-free dairy products  Remember that fat free does not mean calorie free  These foods still contain calories, and too many calories can lead to weight gain  · Trim fat from meat and avoid fried food  Trim all visible fat from meat before you cook it  Remove the skin from poultry  Do not quinteros meat, fish, or poultry  Bake, roast, boil, or broil these foods instead  Avoid fried foods  Eat a baked potato instead of [place] fries  Steam vegetables instead of sautéing them in butter  · Add less fat to foods  Use imitation [place] bits on salads and baked potatoes instead of regular [place] bits  Use fat-free or low-fat salad dressings instead of regular dressings  Use low-fat or nonfat butter-flavored topping instead of regular butter or margarine on popcorn and other foods  Ways to decrease fat in recipes:  Replace high-fat ingredients with low-fat or nonfat ones  This may cause baked goods to be drier than usual  You may need to use nonfat cooking spray on pans to prevent food from sticking   You also may need to change the amount of other ingredients, such as water, in the recipe  Try the following:  · Use low-fat or light margarine instead of regular margarine or shortening  · Use lean ground turkey breast or chicken, or lean ground beef (less than 5% fat) instead of hamburger  · Add 1 teaspoon of canola oil to 8 ounces of skim milk instead of using cream or half and half  · Use grated zucchini, carrots, or apples in breads instead of coconut  · Use blenderized, low-fat cottage cheese, plain tofu, or low-fat ricotta cheese instead of cream cheese  · Use 1 egg white and 1 teaspoon of canola oil, or use ¼ cup (2 ounces) of fat-free egg substitute instead of a whole egg  · Replace half of the oil that is called for in a recipe with applesauce when you bake  Use 3 tablespoons of cocoa powder and 1 tablespoon of canola oil instead of a square of baking chocolate  How to increase fiber:  Eat enough high-fiber foods to get 20 to 30 grams of fiber every day  Slowly increase your fiber intake to avoid stomach cramps, gas, and other problems  · Eat 3 ounces of whole-grain foods each day  An ounce is about 1 slice of bread  Eat whole-grain breads, such as whole-wheat bread  Whole wheat, whole-wheat flour, or other whole grains should be listed as the first ingredient on the food label  Replace white flour with whole-grain flour or use half of each in recipes  Whole-grain flour is heavier than white flour, so you may have to add more yeast or baking powder  · Eat a high-fiber cereal for breakfast   Oatmeal is a good source of soluble fiber  Look for cereals that have bran or fiber in the name  Choose whole-grain products, such as brown rice, barley, and whole-wheat pasta  · Eat more beans, peas, and lentils  For example, add beans to soups or salads  Eat at least 5 cups of fruits and vegetables each day  Eat fruits and vegetables with the peel because the peel is high in fiber    © 2017 [address] [facility] Information is for End User's use only and may not be sold, redistributed or otherwise used for commercial purposes  All illustrations and images included in CareNotes® are the copyrighted property of A [employer] M [employer]  or Emmanuel Liriano  The above information is an  only  It is not intended as medical advice for individual conditions or treatments  Talk to your doctor, nurse or pharmacist before following any medical regimen to see if it is safe and effective for you

## 2020-02-05 ENCOUNTER — OFFICE VISIT (OUTPATIENT)
Dept: FAMILY MEDICINE CLINIC | Facility: CLINIC | Age: 30
End: 2020-02-05
Payer: COMMERCIAL

## 2020-02-05 VITALS
DIASTOLIC BLOOD PRESSURE: 78 MMHG | HEIGHT: 66 IN | WEIGHT: 203.6 LBS | SYSTOLIC BLOOD PRESSURE: 104 MMHG | HEART RATE: 89 BPM | TEMPERATURE: 98.3 F | BODY MASS INDEX: 32.72 KG/M2 | OXYGEN SATURATION: 99 %

## 2020-02-05 DIAGNOSIS — E55.9 VITAMIN D DEFICIENCY: Primary | ICD-10-CM

## 2020-02-05 LAB — HIV 1+2 AB+HIV1 P24 AG SERPL QL IA: NORMAL

## 2020-02-05 PROCEDURE — 99213 OFFICE O/P EST LOW 20 MIN: CPT | Performed by: NURSE PRACTITIONER

## 2020-02-05 PROCEDURE — 1036F TOBACCO NON-USER: CPT | Performed by: NURSE PRACTITIONER

## 2020-02-05 PROCEDURE — 3008F BODY MASS INDEX DOCD: CPT | Performed by: NURSE PRACTITIONER

## 2020-02-05 RX ORDER — ERGOCALCIFEROL 1.25 MG/1
50000 CAPSULE ORAL WEEKLY
Qty: 4 CAPSULE | Refills: 5 | Status: SHIPPED | OUTPATIENT
Start: 2020-02-05 | End: 2021-03-16 | Stop reason: ALTCHOICE

## 2020-02-14 ENCOUNTER — OFFICE VISIT (OUTPATIENT)
Dept: FAMILY MEDICINE CLINIC | Facility: CLINIC | Age: 30
End: 2020-02-14
Payer: COMMERCIAL

## 2020-02-14 VITALS
SYSTOLIC BLOOD PRESSURE: 104 MMHG | BODY MASS INDEX: 32.11 KG/M2 | OXYGEN SATURATION: 98 % | DIASTOLIC BLOOD PRESSURE: 78 MMHG | HEART RATE: 84 BPM | HEIGHT: 66 IN | TEMPERATURE: 98 F | WEIGHT: 199.8 LBS

## 2020-02-14 DIAGNOSIS — J01.90 ACUTE VIRAL SINUSITIS: Primary | ICD-10-CM

## 2020-02-14 DIAGNOSIS — B97.89 ACUTE VIRAL SINUSITIS: Primary | ICD-10-CM

## 2020-02-14 DIAGNOSIS — R09.81 HEAD CONGESTION: ICD-10-CM

## 2020-02-14 DIAGNOSIS — J02.9 SORE THROAT: ICD-10-CM

## 2020-02-14 PROCEDURE — 99213 OFFICE O/P EST LOW 20 MIN: CPT | Performed by: NURSE PRACTITIONER

## 2020-02-14 PROCEDURE — 1036F TOBACCO NON-USER: CPT | Performed by: NURSE PRACTITIONER

## 2020-02-14 PROCEDURE — 3008F BODY MASS INDEX DOCD: CPT | Performed by: NURSE PRACTITIONER

## 2020-02-14 RX ORDER — METHYLPREDNISOLONE 4 MG/1
TABLET ORAL
Qty: 21 EACH | Refills: 0 | Status: SHIPPED | OUTPATIENT
Start: 2020-02-14 | End: 2020-08-11 | Stop reason: ALTCHOICE

## 2020-02-14 RX ORDER — LORATADINE 10 MG/1
10 TABLET ORAL DAILY
Qty: 30 TABLET | Refills: 0 | Status: SHIPPED | OUTPATIENT
Start: 2020-02-14 | End: 2020-08-11 | Stop reason: ALTCHOICE

## 2020-02-14 RX ORDER — FLUTICASONE PROPIONATE 50 MCG
2 SPRAY, SUSPENSION (ML) NASAL 2 TIMES DAILY
Qty: 16 G | Refills: 0 | Status: SHIPPED | OUTPATIENT
Start: 2020-02-14 | End: 2020-08-11 | Stop reason: ALTCHOICE

## 2020-02-14 NOTE — PATIENT INSTRUCTIONS
Rhinosinusitis   AMBULATORY CARE:   Rhinosinusitis (RS)  is inflammation of your nose and sinuses  It commonly begins as a virus, often as a common cold  Viruses usually last 7 to 10 days and do not need treatment  When the virus does not get better on its own, you may have bacterial RS  This means that bacteria have begun to grow inside your sinuses  Acute RS lasts less than 4 weeks  Chronic RS lasts 12 weeks or more  Recurrent RS is when you have 4 or more episodes of RS in one year  Your signs and symptoms  may be worse when you lie on your back or try to sleep  You may have any of the following:  · Stuffy nose and reduced sense of smell     · Runny nose with thick yellow or green mucus     · Pressure or pain on your face or a headache     · Pain in your teeth or bad breath     · Ear pain or pressure     · Fever or cough     · Tiredness  Seek care immediately if:   · You have double vision or you cannot see  · You have a stiff neck, a fever, or a bad headache  · Your eyeball bulges out or you cannot move your eye  · Your eye and eyelid are red, swollen, and painful  · You cannot open your eye  · You are more sleepy than normal, or you notice changes in your ability to think, move, or talk  · You have swelling of your forehead or scalp  Contact your healthcare provider if:   · Your symptoms are worse or do not improve after 3 to 5 days of treatment  · You have questions or concerns about your condition or care  Treatment for rhinosinusitis  may include any of the following:  · Acetaminophen  decreases pain and fever  It is available without a doctor's order  Ask how much to take and how often to take it  Follow directions  Acetaminophen can cause liver damage if not taken correctly  · NSAIDs , such as ibuprofen, help decrease swelling, pain, and fever  This medicine is available with or without a doctor's order   NSAIDs can cause stomach bleeding or kidney problems in certain people  If you take blood thinner medicine, always ask your healthcare provider if NSAIDs are safe for you  Always read the medicine label and follow directions  · Nasal steroid sprays  decrease inflammation in your nose and sinuses  · Decongestants  reduce swelling and drain mucus in the nose and sinuses  They may help you breathe easier  · Antihistamines  dry mucus in the nose and relieve sneezing  · Antibiotics  treat a bacterial infection and may be needed if your symptoms do not improve or they get worse  · Take your medicine as directed  Contact your healthcare provider if you think your medicine is not helping or if you have side effects  Tell him or her if you are allergic to any medicine  Keep a list of the medicines, vitamins, and herbs you take  Include the amounts, and when and why you take them  Bring the list or the pill bottles to follow-up visits  Carry your medicine list with you in case of an emergency  Self-care:   · Rinse your sinuses  Use a sinus rinse device to rinse your nasal passages with a saline (salt water) solution  This will help thin the mucus in your nose and rinse away pollen and dirt  It will also help reduce swelling so you can breathe normally  Ask your healthcare provider how often to do this  · Breathe in steam   Heat a bowl of water until you see steam  Lean over the bowl and make a tent over your head with a large towel  Breathe deeply for about 20 minutes  Be careful not to get too close to the steam or burn yourself  Do this 3 times a day  You can also breathe deeply when you take a hot shower  · Sleep with your head elevated  Place an extra pillow under your head before you go to sleep to help your sinuses drain  · Drink liquids as directed  Ask your healthcare provider how much liquid to drink each day and which liquids are best for you  Liquids will thin the mucus in your nose and help it drain   Avoid drinks that contain alcohol or caffeine  · Do not smoke, and avoid secondhand smoke  Nicotine and other chemicals in cigarettes and cigars can make your symptoms worse  Ask your healthcare provider for information if you currently smoke and need help to quit  E-cigarettes or smokeless tobacco still contain nicotine  Talk to your healthcare provider before you use these products  Follow up with your healthcare provider as directed: Follow up if your symptoms are worse or not better after 3 to 5 days of treatment  Write down your questions so you remember to ask them during your visits  © 2017 [employer] Information is for End User's use only and may not be sold, redistributed or otherwise used for commercial purposes  All illustrations and images included in CareNotes® are the copyrighted property of [employer]  or [employer]ir[employer]  The above information is an  only  It is not intended as medical advice for individual conditions or treatments  Talk to your doctor, nurse or pharmacist before following any medical regimen to see if it is safe and effective for you

## 2020-02-14 NOTE — PROGRESS NOTES
Assessment/Plan:    Problem List Items Addressed This Visit     None      Visit Diagnoses     Acute viral sinusitis    -  Primary    Relevant Medications    fluticasone (FLONASE) 50 mcg/act nasal spray    Misc Natural Product Nasal (NASAL CLEANSE RINSE MIX) PACK    sodium chloride (OCEAN) 0 65 % nasal spray    loratadine (Claritin) 10 mg tablet    methylPREDNISolone 4 MG tablet therapy pack    Sore throat        Head congestion               Diagnoses and all orders for this visit:    Acute viral sinusitis  -     fluticasone (FLONASE) 50 mcg/act nasal spray; 2 sprays into each nostril 2 (two) times a day  -     Misc Natural Product Nasal (NASAL CLEANSE RINSE MIX) PACK; 1 spray into each nostril 2 (two) times a day as needed (nasal irriagtion) for up to 7 days  -     sodium chloride (OCEAN) 0 65 % nasal spray; 2 sprays into each nostril as needed for congestion or rhinitis  -     loratadine (Claritin) 10 mg tablet; Take 1 tablet (10 mg total) by mouth daily  -     methylPREDNISolone 4 MG tablet therapy pack; Use as directed on package    Sore throat    Head congestion        No problem-specific Assessment & Plan notes found for this encounter  Subjective:      Patient ID: Regan Lopez is a 34 y o  female  Regan Lopez c/o URI symptoms of cough and congestion onset 5 days ago  Patient states that her mother-in-law was sick with a cough and a fever  Pt states known sick contacts, mother in law  Pt reports taking theraflu which mildly effective in relieving symptoms  URI    This is a new problem  The current episode started in the past 7 days  The problem has been unchanged  There has been no fever  Associated symptoms include congestion, a plugged ear sensation, rhinorrhea and sneezing   Pertinent negatives include no abdominal pain, chest pain, coughing, diarrhea, dysuria, ear pain, headaches, joint pain, joint swelling, nausea, neck pain, rash, sinus pain, sore throat, swollen glands, vomiting or wheezing  Treatments tried: TheraFlu  The treatment provided mild relief  The following portions of the patient's history were reviewed and updated as appropriate:   She has a past medical history of Deviated septum, Eczema, Recurrent sinus infections, Seizures ([address] ), and Wears glasses  ,  does not have any pertinent problems on file  ,   has a past surgical history that includes Nose surgery (2017); Lymph node dissection; Appendectomy; pr stereotactic comp assist proc,cranial,extradural (N/A, 2/28/2018); pr nasal/sinus ndsc w/total ethoidectomy (N/A, 2/28/2018); and pr repair of nasal septum (N/A, 2/28/2018)  ,  family history includes No Known Problems in her father and mother  ,   reports that she has never smoked  She has never used smokeless tobacco  She reports that she drank alcohol  She reports that she does not use drugs  ,  is allergic to shellfish-derived products     Current Outpatient Medications   Medication Sig Dispense Refill    ergocalciferol (VITAMIN D2) 50,000 units Take 1 capsule (50,000 Units total) by mouth once a week 4 capsule 5    norgestimate-ethinyl estradiol (ORTHO TRI-CYCLEN,TRINESSA) 0 18/0 215/0 25 MG-35 MCG per tablet take 1 tablet by mouth once daily 84 tablet 3    EPINEPHrine (EPIPEN) 0 3 mg/0 3 mL SOAJ Inject 0 3 mg into the shoulder, thigh, or buttocks once      fluticasone (FLONASE) 50 mcg/act nasal spray 2 sprays into each nostril 2 (two) times a day 16 g 0    loratadine (Claritin) 10 mg tablet Take 1 tablet (10 mg total) by mouth daily 30 tablet 0    meclizine (ANTIVERT) 25 mg tablet Take 1 tablet (25 mg total) by mouth 3 (three) times a day as needed for dizziness (Patient not taking: Reported on 2/5/2020) 30 tablet 0    methylPREDNISolone 4 MG tablet therapy pack Use as directed on package 21 each 0    Misc Natural Product Nasal (NASAL CLEANSE RINSE MIX) PACK 1 spray into each nostril 2 (two) times a day as needed (nasal irriagtion) for up to 7 days 1 each 0    sodium chloride (OCEAN) 0 65 % nasal spray 2 sprays into each nostril as needed for congestion or rhinitis 88 mL 0     No current facility-administered medications for this visit  Review of Systems   HENT: Positive for congestion, rhinorrhea, sinus pressure and sneezing  Negative for ear pain, sinus pain and sore throat  Respiratory: Negative for cough and wheezing  Cardiovascular: Negative for chest pain  Gastrointestinal: Negative for abdominal pain, diarrhea, nausea and vomiting  Genitourinary: Negative for dysuria  Musculoskeletal: Negative for joint pain and neck pain  Skin: Negative for rash  Neurological: Negative for headaches  All other systems reviewed and are negative  Objective:  Vitals:    02/14/20 1416   BP: 104/78   BP Location: Left arm   Patient Position: Sitting   Cuff Size: Large   Pulse: 84   Temp: 98 °F (36 7 °C)   TempSrc: Tympanic   SpO2: 98%   Weight: 90 6 kg (199 lb 12 8 oz)   Height: 5' 6" (1 676 m)     Body mass index is 32 25 kg/m²  Physical Exam   Constitutional: She is oriented to person, place, and time  She appears well-developed and well-nourished  She is cooperative  HENT:   Head: Normocephalic and atraumatic  Right Ear: Hearing, tympanic membrane, external ear and ear canal normal    Left Ear: Hearing, tympanic membrane, external ear and ear canal normal    Nose: Rhinorrhea present  Right sinus exhibits no maxillary sinus tenderness and no frontal sinus tenderness  Left sinus exhibits no frontal sinus tenderness  Mouth/Throat: Uvula is midline and mucous membranes are normal  Posterior oropharyngeal erythema present  No tonsillar exudate  Eyes: Pupils are equal, round, and reactive to light  Conjunctivae, EOM and lids are normal    Neck: Trachea normal, normal range of motion, full passive range of motion without pain and phonation normal  Neck supple  No JVD present  No thyroid mass and no thyromegaly present     Cardiovascular: Normal rate, regular rhythm, S1 normal, S2 normal, normal heart sounds, intact distal pulses and normal pulses  Exam reveals no gallop and no friction rub  No murmur heard  Pulmonary/Chest: Effort normal and breath sounds normal  She has no decreased breath sounds  Abdominal: Soft  Normal appearance and bowel sounds are normal  There is no hepatosplenomegaly  There is no tenderness  No hernia  Genitourinary:   Genitourinary Comments: Deferred    Musculoskeletal: Normal range of motion  Neurological: She is alert and oriented to person, place, and time  She has normal reflexes  No cranial nerve deficit  Skin: Skin is warm, dry and intact  Capillary refill takes less than 2 seconds  Psychiatric: She has a normal mood and affect  Her speech is normal and behavior is normal  Judgment and thought content normal  Cognition and memory are normal    Nursing note and vitals reviewed

## 2020-03-01 DIAGNOSIS — Z30.41 ENCOUNTER FOR SURVEILLANCE OF CONTRACEPTIVE PILLS: ICD-10-CM

## 2020-03-02 RX ORDER — NORGESTIMATE AND ETHINYL ESTRADIOL 7DAYSX3 28
KIT ORAL
Qty: 84 TABLET | Refills: 3 | Status: SHIPPED | OUTPATIENT
Start: 2020-03-02 | End: 2020-05-12 | Stop reason: SDUPTHER

## 2020-05-12 DIAGNOSIS — Z30.41 ENCOUNTER FOR SURVEILLANCE OF CONTRACEPTIVE PILLS: ICD-10-CM

## 2020-05-12 RX ORDER — NORGESTIMATE AND ETHINYL ESTRADIOL 7DAYSX3 28
1 KIT ORAL DAILY
Qty: 84 TABLET | Refills: 0 | Status: SHIPPED | OUTPATIENT
Start: 2020-05-12 | End: 2020-07-09

## 2020-07-09 DIAGNOSIS — Z30.41 ENCOUNTER FOR SURVEILLANCE OF CONTRACEPTIVE PILLS: ICD-10-CM

## 2020-07-09 RX ORDER — NORGESTIMATE AND ETHINYL ESTRADIOL 7DAYSX3 28
KIT ORAL
Qty: 84 TABLET | Refills: 0 | Status: SHIPPED | OUTPATIENT
Start: 2020-07-09 | End: 2020-09-17

## 2020-08-11 ENCOUNTER — OFFICE VISIT (OUTPATIENT)
Dept: FAMILY MEDICINE CLINIC | Facility: CLINIC | Age: 30
End: 2020-08-11
Payer: COMMERCIAL

## 2020-08-11 VITALS
WEIGHT: 203.6 LBS | BODY MASS INDEX: 32.72 KG/M2 | SYSTOLIC BLOOD PRESSURE: 102 MMHG | DIASTOLIC BLOOD PRESSURE: 62 MMHG | TEMPERATURE: 98.6 F | HEART RATE: 80 BPM | HEIGHT: 66 IN | OXYGEN SATURATION: 100 %

## 2020-08-11 DIAGNOSIS — Z13.1 SCREENING FOR DIABETES MELLITUS: ICD-10-CM

## 2020-08-11 DIAGNOSIS — E55.9 VITAMIN D DEFICIENCY: ICD-10-CM

## 2020-08-11 DIAGNOSIS — Z13.220 SCREENING FOR HYPERLIPIDEMIA: ICD-10-CM

## 2020-08-11 DIAGNOSIS — Z28.21 INFLUENZA VACCINATION DECLINED: ICD-10-CM

## 2020-08-11 DIAGNOSIS — Z13.0 SCREENING FOR DEFICIENCY ANEMIA: ICD-10-CM

## 2020-08-11 DIAGNOSIS — Z00.00 ANNUAL PHYSICAL EXAM: Primary | ICD-10-CM

## 2020-08-11 DIAGNOSIS — E66.09 CLASS 1 OBESITY DUE TO EXCESS CALORIES WITHOUT SERIOUS COMORBIDITY WITH BODY MASS INDEX (BMI) OF 32.0 TO 32.9 IN ADULT: ICD-10-CM

## 2020-08-11 DIAGNOSIS — Z13.29 SCREENING FOR THYROID DISORDER: ICD-10-CM

## 2020-08-11 PROBLEM — E66.811 CLASS 1 OBESITY DUE TO EXCESS CALORIES WITHOUT SERIOUS COMORBIDITY WITH BODY MASS INDEX (BMI) OF 32.0 TO 32.9 IN ADULT: Status: ACTIVE | Noted: 2020-08-11

## 2020-08-11 PROCEDURE — 1036F TOBACCO NON-USER: CPT | Performed by: NURSE PRACTITIONER

## 2020-08-11 PROCEDURE — 3008F BODY MASS INDEX DOCD: CPT | Performed by: NURSE PRACTITIONER

## 2020-08-11 PROCEDURE — 99395 PREV VISIT EST AGE 18-39: CPT | Performed by: NURSE PRACTITIONER

## 2020-08-11 NOTE — PATIENT INSTRUCTIONS
Wellness Visit for Adults   AMBULATORY CARE:   A wellness visit  is when you see your healthcare provider to get screened for health problems  You can also get advice on how to stay healthy  Write down your questions so you remember to ask them  Ask your healthcare provider how often you should have a wellness visit  What happens at a wellness visit:  Your healthcare provider will ask about your health, and your family history of health problems  This includes high blood pressure, heart disease, and cancer  He or she will ask if you have symptoms that concern you, if you smoke, and about your mood  You may also be asked about your intake of medicines, supplements, food, and alcohol  Any of the following may be done:  · Your weight  will be checked  Your height may also be checked so your body mass index (BMI) can be calculated  Your BMI shows if you are at a healthy weight  · Your blood pressure  and heart rate will be checked  Your temperature may also be checked  · Blood and urine tests  may be done  Blood tests may be done to check your cholesterol levels  Abnormal cholesterol levels increase your risk for heart disease and stroke  You may also need a blood or urine test to check for diabetes if you are at increased risk  Urine tests may be done to look for signs of an infection or kidney disease  · A physical exam  includes checking your heartbeat and lungs with a stethoscope  Your healthcare provider may also check your skin to look for sun damage  · Screening tests  may be recommended  A screening test is done to check for diseases that may not cause symptoms  The screening tests you may need depend on your age, gender, family history, and lifestyle habits  For example, colorectal screening may be recommended if you are 48years old or older  Screening tests you need if you are a woman:   · A Pap smear  is used to screen for cervical cancer   Pap smears are usually done every 3 to 5 years depending on your age  You may need them more often if you have had abnormal Pap smear test results in the past  Ask your healthcare provider how often you should have a Pap smear  · A mammogram  is an x-ray of your breasts to screen for breast cancer  Experts recommend mammograms every 2 years starting at age 48 years  You may need a mammogram at age 52 years or younger if you have an increased risk for breast cancer  Talk to your healthcare provider about when you should start having mammograms and how often you need them  Vaccines you may need:   · Get an influenza vaccine  every year  The influenza vaccine protects you from the flu  Several types of viruses cause the flu  The viruses change over time, so new vaccines are made each year  · Get a tetanus-diphtheria (Td) booster vaccine  every 10 years  This vaccine protects you against tetanus and diphtheria  Tetanus is a severe infection that may cause painful muscle spasms and lockjaw  Diphtheria is a severe bacterial infection that causes a thick covering in the back of your mouth and throat  · Get a human papillomavirus (HPV) vaccine  if you are female and aged 23 to 32 or male 23 to 24 and never received it  This vaccine protects you from HPV infection  HPV is the most common infection spread by sexual contact  HPV may also cause vaginal, penile, and anal cancers  · Get a pneumococcal vaccine  if you are aged 72 years or older  The pneumococcal vaccine is an injection given to protect you from pneumococcal disease  Pneumococcal disease is an infection caused by pneumococcal bacteria  The infection may cause pneumonia, meningitis, or an ear infection  · Get a shingles vaccine  if you are aged 61 or older, even if you have had shingles before  The shingles vaccine is an injection to protect you from the varicella-zoster virus  This is the same virus that causes chickenpox   Shingles is a painful rash that develops in people who had chickenpox or have been exposed to the virus  How to eat healthy:  My Plate is a model for planning healthy meals  It shows the types and amounts of foods that should go on your plate  Fruits and vegetables make up about half of your plate, and grains and protein make up the other half  A serving of dairy is included on the side of your plate  The amount of calories and serving sizes you need depends on your age, gender, weight, and height  Examples of healthy foods are listed below:  · Eat a variety of vegetables  such as dark green, red, and orange vegetables  You can also include canned vegetables low in sodium (salt) and frozen vegetables without added butter or sauces  · Eat a variety of fresh fruits , canned fruit in 100% juice, frozen fruit, and dried fruit  · Include whole grains  At least half of the grains you eat should be whole grains  Examples include whole-wheat bread, wheat pasta, brown rice, and whole-grain cereals such as oatmeal     · Eat a variety of protein foods such as seafood (fish and shellfish), lean meat, and poultry without skin (turkey and chicken)  Examples of lean meats include pork leg, shoulder, or tenderloin, and beef round, sirloin, tenderloin, and extra lean ground beef  Other protein foods include eggs and egg substitutes, beans, peas, soy products, nuts, and seeds  · Choose low-fat dairy products such as skim or 1% milk or low-fat yogurt, cheese, and cottage cheese  · Limit unhealthy fats  such as butter, hard margarine, and shortening  Exercise:  Exercise at least 30 minutes per day on most days of the week  Some examples of exercise include walking, biking, dancing, and swimming  You can also fit in more physical activity by taking the stairs instead of the elevator or parking farther away from stores  Include muscle strengthening activities 2 days each week  Regular exercise provides many health benefits   It helps you manage your weight, and decreases your risk for type 2 diabetes, heart disease, stroke, and high blood pressure  Exercise can also help improve your mood  Ask your healthcare provider about the best exercise plan for you  General health and safety guidelines:   · Do not smoke  Nicotine and other chemicals in cigarettes and cigars can cause lung damage  Ask your healthcare provider for information if you currently smoke and need help to quit  E-cigarettes or smokeless tobacco still contain nicotine  Talk to your healthcare provider before you use these products  · Limit alcohol  A drink of alcohol is 12 ounces of beer, 5 ounces of wine, or 1½ ounces of liquor  · Lose weight, if needed  Being overweight increases your risk of certain health conditions  These include heart disease, high blood pressure, type 2 diabetes, and certain types of cancer  · Protect your skin  Do not sunbathe or use tanning beds  Use sunscreen with a SPF 15 or higher  Apply sunscreen at least 15 minutes before you go outside  Reapply sunscreen every 2 hours  Wear protective clothing, hats, and sunglasses when you are outside  · Drive safely  Always wear your seatbelt  Make sure everyone in your car wears a seatbelt  A seatbelt can save your life if you are in an accident  Do not use your cell phone when you are driving  This could distract you and cause an accident  Pull over if you need to make a call or send a text message  · Practice safe sex  Use latex condoms if are sexually active and have more than one partner  Your healthcare provider may recommend screening tests for sexually transmitted infections (STIs)  · Wear helmets, lifejackets, and protective gear  Always wear a helmet when you ride a bike or motorcycle, go skiing, or play sports that could cause a head injury  Wear protective equipment when you play sports  Wear a lifejacket when you are on a boat or doing water sports    © 2017 [address] [employer] Information is for End User's use only and may not be sold, redistributed or otherwise used for commercial purposes  All illustrations and images included in CareNotes® are the copyrighted property of [employer]  or Emmanuel Liriano  The above information is an  only  It is not intended as medical advice for individual conditions or treatments  Talk to your doctor, nurse or pharmacist before following any medical regimen to see if it is safe and effective for you  Weight Management   AMBULATORY CARE:   Why it is important to manage your weight:  Being overweight increases your risk of health conditions such as heart disease, high blood pressure, type 2 diabetes, and certain types of cancer  It can also increase your risk for osteoarthritis, sleep apnea, and other respiratory problems  Aim for a slow, steady weight loss  Even a small amount of weight loss can lower your risk of health problems  How to lose weight safely:  A safe and healthy way to lose weight is to eat fewer calories and get regular exercise  You can lose up about 1 pound a week by decreasing the number of calories you eat by 500 calories each day  You can decrease calories by eating smaller portion sizes or by cutting out high-calorie foods  Read labels to find out how many calories are in the foods you eat  You can also burn calories with exercise such as walking, swimming, or biking  You will be more likely to keep weight off if you make these changes part of your lifestyle  Healthy meal plan for weight management:  A healthy meal plan includes a variety of foods, contains fewer calories, and helps you stay healthy  A healthy meal plan includes the following:  · Eat whole-grain foods more often  A healthy meal plan should contain fiber  Fiber is the part of grains, fruits, and vegetables that is not broken down by your body  Whole-grain foods are healthy and provide extra fiber in your diet   Some examples of whole-grain foods are whole-wheat breads and pastas, oatmeal, brown rice, and bulgur  · Eat a variety of vegetables every day  Include dark, leafy greens such as spinach, kale, velma greens, and mustard greens  Eat yellow and orange vegetables such as carrots, sweet potatoes, and winter squash  · Eat a variety of fruits every day  Choose fresh or canned fruit (canned in its own juice or light syrup) instead of juice  Fruit juice has very little or no fiber  · Eat low-fat dairy foods  Drink fat-free (skim) milk or 1% milk  Eat fat-free yogurt and low-fat cottage cheese  Try low-fat cheeses such as mozzarella and other reduced-fat cheeses  · Choose meat and other protein foods that are low in fat  Choose beans or other legumes such as split peas or lentils  Choose fish, skinless poultry (chicken or turkey), or lean cuts of red meat (beef or pork)  Before you cook meat or poultry, cut off any visible fat  · Use less fat and oil  Try baking foods instead of frying them  Add less fat, such as margarine, sour cream, regular salad dressing and mayonnaise to foods  Eat fewer high-fat foods  Some examples of high-fat foods include french fries, doughnuts, ice cream, and cakes  · Eat fewer sweets  Limit foods and drinks that are high in sugar  This includes candy, cookies, regular soda, and sweetened drinks  Ways to decrease calories:   · Eat smaller portions  ¨ Use a small plate with smaller servings  ¨ Do not eat second helpings  ¨ When you eat at a restaurant, ask for a box and place half of your meal in the box before you eat  ¨ Share an entrée with someone else  · Replace high-calorie snacks with healthy, low-calorie snacks  ¨ Choose fresh fruit, vegetables, fat-free rice cakes, or air-popped popcorn instead of potato chips, nuts, or chocolate  ¨ Choose water or calorie-free drinks instead of soda or sweetened drinks  · Eat regular meals  Skipping meals can lead to overeating later in the day   Eat a healthy snack in place of a meal if you do not have time to eat a regular meal      · Do not shop for groceries when you are hungry  You may be more likely to make unhealthy food choices  Take a grocery list of healthy foods and shop after you have eaten  Exercise:  Exercise at least 30 minutes per day on most days of the week  Some examples of exercise include walking, biking, dancing, and swimming  You can also fit in more physical activity by taking the stairs instead of the elevator or parking farther away from stores  Ask your healthcare provider about the best exercise plan for you  Other things to consider as you try to lose weight:   · Be aware of situations that may give you the urge to overeat, such as eating while watching television  Find ways to avoid these situations  For example, read a book, go for a walk, or do crafts  · Meet with a weight loss support group or friends who are also trying to lose weight  This may help you stay motivated to continue working on your weight loss goals  © 2017 [address] [employer]  Information is for End User's use only and may not be sold, redistributed or otherwise used for commercial purposes  All illustrations and images included in CareNotes® are the copyrighted property of [employer]  or [employer]  The above information is an  only  It is not intended as medical advice for individual conditions or treatments  Talk to your doctor, nurse or pharmacist before following any medical regimen to see if it is safe and effective for you  Obesity   AMBULATORY CARE:   Obesity  is when your body mass index (BMI) is greater than 30  Your healthcare provider will use your height and weight to measure your BMI  The risks of obesity include  many health problems, such as injuries or physical disability   You may need tests to check for the following:  · Diabetes     · High blood pressure or high cholesterol     · Heart disease · Gallbladder or liver disease     · Cancer of the colon, breast, prostate, liver, or kidney     · Sleep apnea     · Arthritis or gout  Seek care immediately if:   · You have a severe headache, confusion, or difficulty speaking  · You have weakness on one side of your body  · You have chest pain, sweating, or shortness of breath  Contact your healthcare provider if:   · You have symptoms of gallbladder or liver disease, such as pain in your upper abdomen  · You have knee or hip pain and discomfort while walking  · You have symptoms of diabetes, such as intense hunger and thirst, and frequent urination  · You have symptoms of sleep apnea, such as snoring or daytime sleepiness  · You have questions or concerns about your condition or care  Treatment for obesity  focuses on helping you lose weight to improve your health  Even a small decrease in BMI can reduce the risk for many health problems  Your healthcare provider will help you set a weight-loss goal   · Lifestyle changes  are the first step in treating obesity  These include making healthy food choices and getting regular physical activity  Your healthcare provider may suggest a weight-loss program that involves coaching, education, and therapy  · Medicine  may help you lose weight when it is used with a healthy diet and physical activity  · Surgery  can help you lose weight if you are very obese and have other health problems  There are several types of weight-loss surgery  Ask your healthcare provider for more information  Be successful losing weight:   · Set small, realistic goals  An example of a small goal is to walk for 20 minutes 5 days a week  Tristan goal is to lose 5% of your body weight  · Tell friends, family members, and coworkers about your goals  and ask for their support  Ask a friend to lose weight with you, or join a weight-loss support group      · Identify foods or triggers that may cause you to overeat , and find ways to avoid them  Remove tempting high-calorie foods from your home and workplace  Place a bowl of fresh fruit on your kitchen counter  If stress causes you to eat, then find other ways to cope with stress  · Keep a diary to track what you eat and drink  Also write down how many minutes of physical activity you do each day  Weigh yourself once a week and record it in your diary  Eating changes: You will need to eat 500 to 1,000 fewer calories each day than you currently eat to lose 1 to 2 pounds a week  The following changes will help you cut calories:  · Eat smaller portions  Use small plates, no larger than 9 inches in diameter  Fill your plate half full of fruits and vegetables  Measure your food using measuring cups until you know what a serving size looks like  · Eat 3 meals and 1 or 2 snacks each day  Plan your meals in advance  Jaime Castillo and eat at home most of the time  Eat slowly  · Eat fruits and vegetables at every meal   They are low in calories and high in fiber, which makes you feel full  Do not add butter, margarine, or cream sauce to vegetables  Use herbs to season steamed vegetables  · Eat less fat and fewer fried foods  Eat more baked or grilled chicken and fish  These protein sources are lower in calories and fat than red meat  Limit fast food  Dress your salads with olive oil and vinegar instead of bottled dressing  · Limit the amount of sugar you eat  Do not drink sugary beverages  Limit alcohol  Activity changes:  Physical activity is good for your body in many ways  It helps you burn calories and build strong muscles  It decreases stress and depression, and improves your mood  It can also help you sleep better  Talk to your healthcare provider before you begin an exercise program   · Exercise for at least 30 minutes 5 days a week  Start slowly  Set aside time each day for physical activity that you enjoy and that is convenient for you   It is best to do both weight training and an activity that increases your heart rate, such as walking, bicycling, or swimming  · Find ways to be more active  Do yard work and housecleaning  Walk up the stairs instead of using elevators  Spend your leisure time going to events that require walking, such as outdoor festivals or fairs  This extra physical activity can help you lose weight and keep it off  Follow up with your healthcare provider as directed: You may need to meet with a dietitian  Write down your questions so you remember to ask them during your visits  © 2017 [facility] Information is for End User's use only and may not be sold, redistributed or otherwise used for commercial purposes  All illustrations and images included in CareNotes® are the copyrighted property of [employer]  or Emmanuel Liriano  The above information is an  only  It is not intended as medical advice for individual conditions or treatments  Talk to your doctor, nurse or pharmacist before following any medical regimen to see if it is safe and effective for you

## 2020-08-11 NOTE — PROGRESS NOTES
ADULT ANNUAL [facility] CARE    NAME: Daniel Lopez  AGE: 34 y o  SEX: female  : 1990     DATE: 2020     Assessment and Plan:     Problem List Items Addressed This Visit     Vitamin D deficiency    Relevant Orders    Vitamin D 25 hydroxy    Class 1 obesity due to excess calories without serious comorbidity with body mass index (BMI) of 32 0 to 32 9 in adult    Influenza vaccination declined      Other Visit Diagnoses     Annual physical exam    -  Primary    Screening for thyroid disorder        Relevant Orders    TSH, 3rd generation with Free T4 reflex    Screening for hyperlipidemia        Relevant Orders    Lipid Panel with Direct LDL reflex    Screening for diabetes mellitus        Relevant Orders    Comprehensive metabolic panel    Screening for deficiency anemia        Relevant Orders    CBC and differential          Immunizations and preventive care screenings were discussed with patient today  Appropriate education was printed on patient's after visit summary  Counseling:  Alcohol/drug use: discussed moderation in alcohol intake, the recommendations for healthy alcohol use, and avoidance of illicit drug use  Dental Health: discussed importance of regular tooth brushing, flossing, and dental visits  Injury prevention: discussed safety/seat belts, safety helmets, smoke detectors, carbon dioxide detectors, and smoking near bedding or upholstery  Sexual health: discussed sexually transmitted diseases, partner selection, use of condoms, avoidance of unintended pregnancy, and contraceptive alternatives  · Exercise: the importance of regular exercise/physical activity was discussed  Recommend exercise 3-5 times per week for at least 30 minutes  BMI Counseling: Body mass index is 32 86 kg/m²   The BMI is above normal  Nutrition recommendations include decreasing portion sizes, encouraging healthy choices of fruits and vegetables, decreasing fast food intake, consuming healthier snacks, limiting drinks that contain sugar, moderation in carbohydrate intake, increasing intake of lean protein, reducing intake of saturated and trans fat and reducing intake of cholesterol  Exercise recommendations include vigorous physical activity 75 minutes/week, exercising 3-5 times per week and strength training exercises  No pharmacotherapy was ordered  Return in 6 months (on 2/11/2021)  Chief Complaint:     Chief Complaint   Patient presents with    Annual Exam      History of Present Illness:     Adult Annual Physical   Patient here for a comprehensive physical exam  The patient reports no problems  Diet and Physical Activity  · Diet/Nutrition: well balanced diet, limited junk food, consuming 3-5 servings of fruits/vegetables daily, adequate fiber intake and adequate whole grain intake  · Exercise: walking, 3-4 times a week on average and 30-60 minutes on average  Depression Screening  PHQ-9 Depression Screening    PHQ-9:    Frequency of the following problems over the past two weeks:            General Health  · Sleep: sleeps well and gets 7-8 hours of sleep on average  · Hearing: normal - bilateral   · Vision: goes for regular eye exams, most recent eye exam <1 year ago and wears glasses  · Dental: regular dental visits, brushes teeth twice daily and flosses teeth occasionally  /GYN Health  · Last menstrual period: 7/23/2020  · Contraceptive method: oral contraceptives  · History of STDs?: no      Review of Systems:     Review of Systems   Constitutional: Negative  HENT: Negative  Eyes: Negative  Respiratory: Negative  Cardiovascular: Negative  Gastrointestinal: Negative  Endocrine: Negative  Genitourinary: Negative  Musculoskeletal: Negative  Skin: Negative  Allergic/Immunologic: Negative  Neurological: Negative  Hematological: Negative      Psychiatric/Behavioral: Negative  Past Medical History:     Past Medical History:   Diagnosis Date    Deviated septum     Eczema     Recurrent sinus infections     Seizures ([address] )     febrile as a child "grew out of them"    Wears glasses       Past Surgical History:     Past Surgical History:   Procedure Laterality Date    APPENDECTOMY      LYMPH NODE DISSECTION      "in neck completely removed age 11"   [facility] NOSE SURGERY  2017    AR NASAL/SINUS NDSC W/TOTAL ETHOIDECTOMY N/A 2/28/2018    Procedure: PARTIAL INFERIOR TURBINATE REDUCTION;  Surgeon: Faby Thomas DO;  [facility];  Service: ENT    AR REPAIR OF NASAL SEPTUM N/A 2/28/2018    Procedure: SEPTOPLASTY WITH REMOVAL OF RIGHT FLOOR SPUR;  Surgeon: Faby Thomas DO;  [facility];  Service: ENT    AR STEREOTACTIC COMP ASSIST PROC,CRANIAL,EXTRADURAL N/A 2/28/2018    Procedure: FUNCTIONAL ENDOSCOPIC SINUS SURGERY (FESS) IMAGED GUIDED; Surgeon:  Faby Thomas DO;  [facility];  Service: ENT      Social History:     E-Cigarette/Vaping    E-Cigarette Use Never User      E-Cigarette/Vaping Substances    Nicotine No     THC No     CBD No     Flavoring No     Other No     Unknown No      Social History     Socioeconomic History    Marital status: Single     Spouse name: None    Number of children: None    Years of education: None    Highest education level: None   Occupational History    None   Social Needs    Financial resource strain: None    Food insecurity     Worry: None     Inability: None    Transportation needs     Medical: None     Non-medical: None   Tobacco Use    Smoking status: Never Smoker    Smokeless tobacco: Never Used   Substance and Sexual Activity    Alcohol use: Not Currently     Comment: social    Drug use: No    Sexual activity: Not Currently     Birth control/protection: OCP   Lifestyle    Physical activity     Days per week: None     Minutes per session: None    Stress: None   Relationships    Social connections     Talks on phone: None     Gets together: None     Attends Muslim service: None     Active member of club or organization: None     Attends meetings of clubs or organizations: None     Relationship status: None    Intimate partner violence     Fear of current or ex partner: None     Emotionally abused: None     Physically abused: None     Forced sexual activity: None   Other Topics Concern    None   Social History Narrative    None      Family History:     Family History   Problem Relation Age of Onset    No Known Problems Mother     No Known Problems Father       Current Medications:     Current Outpatient Medications   Medication Sig Dispense Refill    ergocalciferol (VITAMIN D2) 50,000 units Take 1 capsule (50,000 Units total) by mouth once a week 4 capsule 5    TRI-SPRINTEC 0 18/0 215/0 25 MG-35 MCG per tablet TAKE 1 TABLET BY MOUTH  DAILY 84 tablet 0    EPINEPHrine (EPIPEN) 0 3 mg/0 3 mL SOAJ Inject 0 3 mg into the shoulder, thigh, or buttocks once       No current facility-administered medications for this visit  Allergies: Allergies   Allergen Reactions    Shellfish-Derived Products Shortness Of Breath     Muscle spasms      Physical Exam:     /62 (BP Location: Left arm, Patient Position: Sitting, Cuff Size: Large)   Pulse 80   Temp 98 6 °F (37 °C) (Tympanic)   Ht 5' 6" (1 676 m)   Wt 92 4 kg (203 lb 9 6 oz)   SpO2 100%   BMI 32 86 kg/m²     Physical Exam  Vitals signs and nursing note reviewed  Constitutional:       Appearance: She is well-developed  HENT:      Head: Normocephalic  Right Ear: External ear normal       Left Ear: External ear normal       Nose: Nose normal    Eyes:      Conjunctiva/sclera: Conjunctivae normal       Pupils: Pupils are equal, round, and reactive to light  Neck:      Musculoskeletal: Normal range of motion and neck supple  Cardiovascular:      Rate and Rhythm: Normal rate and regular rhythm        Heart sounds: Normal heart sounds  Pulmonary:      Effort: Pulmonary effort is normal       Breath sounds: Normal breath sounds  Abdominal:      General: Bowel sounds are normal       Palpations: Abdomen is soft  Musculoskeletal: Normal range of motion  Skin:     General: Skin is warm and dry  Capillary Refill: Capillary refill takes less than 2 seconds  Neurological:      Mental Status: She is alert and oriented to person, place, and time  Psychiatric:         Behavior: Behavior normal          Thought Content:  Thought content normal          Judgment: Judgment normal           SHELDON Guevara [address]

## 2020-09-16 DIAGNOSIS — Z30.41 ENCOUNTER FOR SURVEILLANCE OF CONTRACEPTIVE PILLS: ICD-10-CM

## 2020-09-17 RX ORDER — NORGESTIMATE AND ETHINYL ESTRADIOL 7DAYSX3 28
KIT ORAL
Qty: 84 TABLET | Refills: 3 | Status: SHIPPED | OUTPATIENT
Start: 2020-09-17 | End: 2021-08-23

## 2020-12-30 ENCOUNTER — VBI (OUTPATIENT)
Dept: ADMINISTRATIVE | Facility: OTHER | Age: 30
End: 2020-12-30

## 2021-03-10 ENCOUNTER — APPOINTMENT (OUTPATIENT)
Dept: LAB | Facility: CLINIC | Age: 31
End: 2021-03-10
Payer: COMMERCIAL

## 2021-03-10 DIAGNOSIS — Z13.0 SCREENING FOR DEFICIENCY ANEMIA: ICD-10-CM

## 2021-03-10 DIAGNOSIS — E55.9 VITAMIN D DEFICIENCY: ICD-10-CM

## 2021-03-10 DIAGNOSIS — Z13.1 SCREENING FOR DIABETES MELLITUS: ICD-10-CM

## 2021-03-10 DIAGNOSIS — Z13.29 SCREENING FOR THYROID DISORDER: ICD-10-CM

## 2021-03-10 DIAGNOSIS — Z13.220 SCREENING FOR HYPERLIPIDEMIA: ICD-10-CM

## 2021-03-10 LAB
25(OH)D3 SERPL-MCNC: 14.4 NG/ML (ref 30–100)
ALBUMIN SERPL BCP-MCNC: 3.5 G/DL (ref 3.5–5)
ALP SERPL-CCNC: 90 U/L (ref 46–116)
ALT SERPL W P-5'-P-CCNC: 16 U/L (ref 12–78)
ANION GAP SERPL CALCULATED.3IONS-SCNC: 6 MMOL/L (ref 4–13)
AST SERPL W P-5'-P-CCNC: 10 U/L (ref 5–45)
BASOPHILS # BLD AUTO: 0.07 THOUSANDS/ΜL (ref 0–0.1)
BASOPHILS NFR BLD AUTO: 1 % (ref 0–1)
BILIRUB SERPL-MCNC: 0.48 MG/DL (ref 0.2–1)
BUN SERPL-MCNC: 14 MG/DL (ref 5–25)
CALCIUM SERPL-MCNC: 8.8 MG/DL (ref 8.3–10.1)
CHLORIDE SERPL-SCNC: 105 MMOL/L (ref 100–108)
CHOLEST SERPL-MCNC: 266 MG/DL (ref 50–200)
CO2 SERPL-SCNC: 27 MMOL/L (ref 21–32)
CREAT SERPL-MCNC: 0.79 MG/DL (ref 0.6–1.3)
EOSINOPHIL # BLD AUTO: 0.32 THOUSAND/ΜL (ref 0–0.61)
EOSINOPHIL NFR BLD AUTO: 4 % (ref 0–6)
ERYTHROCYTE [DISTWIDTH] IN BLOOD BY AUTOMATED COUNT: 13.2 % (ref 11.6–15.1)
GFR SERPL CREATININE-BSD FRML MDRD: 101 ML/MIN/1.73SQ M
GLUCOSE P FAST SERPL-MCNC: 78 MG/DL (ref 65–99)
HCT VFR BLD AUTO: 38 % (ref 34.8–46.1)
HDLC SERPL-MCNC: 50 MG/DL
HGB BLD-MCNC: 12.1 G/DL (ref 11.5–15.4)
IMM GRANULOCYTES # BLD AUTO: 0.02 THOUSAND/UL (ref 0–0.2)
IMM GRANULOCYTES NFR BLD AUTO: 0 % (ref 0–2)
LDLC SERPL CALC-MCNC: 167 MG/DL (ref 0–100)
LYMPHOCYTES # BLD AUTO: 2.19 THOUSANDS/ΜL (ref 0.6–4.47)
LYMPHOCYTES NFR BLD AUTO: 29 % (ref 14–44)
MCH RBC QN AUTO: 27.9 PG (ref 26.8–34.3)
MCHC RBC AUTO-ENTMCNC: 31.8 G/DL (ref 31.4–37.4)
MCV RBC AUTO: 88 FL (ref 82–98)
MONOCYTES # BLD AUTO: 0.51 THOUSAND/ΜL (ref 0.17–1.22)
MONOCYTES NFR BLD AUTO: 7 % (ref 4–12)
NEUTROPHILS # BLD AUTO: 4.49 THOUSANDS/ΜL (ref 1.85–7.62)
NEUTS SEG NFR BLD AUTO: 59 % (ref 43–75)
NRBC BLD AUTO-RTO: 0 /100 WBCS
PLATELET # BLD AUTO: 301 THOUSANDS/UL (ref 149–390)
PMV BLD AUTO: 9.2 FL (ref 8.9–12.7)
POTASSIUM SERPL-SCNC: 3.9 MMOL/L (ref 3.5–5.3)
PROT SERPL-MCNC: 7.5 G/DL (ref 6.4–8.2)
RBC # BLD AUTO: 4.33 MILLION/UL (ref 3.81–5.12)
SODIUM SERPL-SCNC: 138 MMOL/L (ref 136–145)
TRIGL SERPL-MCNC: 246 MG/DL
TSH SERPL DL<=0.05 MIU/L-ACNC: 2.89 UIU/ML (ref 0.36–3.74)
WBC # BLD AUTO: 7.6 THOUSAND/UL (ref 4.31–10.16)

## 2021-03-10 PROCEDURE — 85025 COMPLETE CBC W/AUTO DIFF WBC: CPT

## 2021-03-10 PROCEDURE — 80053 COMPREHEN METABOLIC PANEL: CPT

## 2021-03-10 PROCEDURE — 36415 COLL VENOUS BLD VENIPUNCTURE: CPT

## 2021-03-10 PROCEDURE — 84443 ASSAY THYROID STIM HORMONE: CPT

## 2021-03-10 PROCEDURE — 80061 LIPID PANEL: CPT

## 2021-03-10 PROCEDURE — 82306 VITAMIN D 25 HYDROXY: CPT

## 2021-03-16 ENCOUNTER — OFFICE VISIT (OUTPATIENT)
Dept: FAMILY MEDICINE CLINIC | Facility: CLINIC | Age: 31
End: 2021-03-16
Payer: COMMERCIAL

## 2021-03-16 VITALS
BODY MASS INDEX: 33.56 KG/M2 | WEIGHT: 208.8 LBS | HEART RATE: 104 BPM | HEIGHT: 66 IN | OXYGEN SATURATION: 97 % | SYSTOLIC BLOOD PRESSURE: 128 MMHG | TEMPERATURE: 97.1 F | DIASTOLIC BLOOD PRESSURE: 78 MMHG

## 2021-03-16 DIAGNOSIS — E66.09 CLASS 1 OBESITY DUE TO EXCESS CALORIES WITHOUT SERIOUS COMORBIDITY WITH BODY MASS INDEX (BMI) OF 33.0 TO 33.9 IN ADULT: ICD-10-CM

## 2021-03-16 DIAGNOSIS — J01.00 ACUTE NON-RECURRENT MAXILLARY SINUSITIS: Primary | ICD-10-CM

## 2021-03-16 DIAGNOSIS — E55.9 VITAMIN D DEFICIENCY: ICD-10-CM

## 2021-03-16 DIAGNOSIS — Z28.21 INFLUENZA VACCINATION DECLINED: ICD-10-CM

## 2021-03-16 PROCEDURE — 99213 OFFICE O/P EST LOW 20 MIN: CPT | Performed by: NURSE PRACTITIONER

## 2021-03-16 RX ORDER — ERGOCALCIFEROL (VITAMIN D2) 50 MCG
1 CAPSULE ORAL DAILY
COMMUNITY
End: 2021-03-16

## 2021-03-16 RX ORDER — METHYLPREDNISOLONE 4 MG/1
TABLET ORAL
Qty: 21 EACH | Refills: 0 | Status: SHIPPED | OUTPATIENT
Start: 2021-03-16 | End: 2021-10-13

## 2021-03-16 RX ORDER — METHYLPREDNISOLONE 4 MG/1
TABLET ORAL
Qty: 21 EACH | Refills: 0 | Status: SHIPPED | OUTPATIENT
Start: 2021-03-16 | End: 2021-03-16 | Stop reason: SDUPTHER

## 2021-03-16 RX ORDER — CHOLECALCIFEROL (VITAMIN D3) 125 MCG
2000 CAPSULE ORAL DAILY
Qty: 90 TABLET | Refills: 3 | Status: SHIPPED | OUTPATIENT
Start: 2021-03-16 | End: 2021-10-04

## 2021-03-16 RX ORDER — ERGOCALCIFEROL 1.25 MG/1
50000 CAPSULE ORAL WEEKLY
Qty: 12 CAPSULE | Refills: 1 | Status: SHIPPED | OUTPATIENT
Start: 2021-03-16 | End: 2021-10-04

## 2021-03-16 NOTE — PROGRESS NOTES
Assessment/Plan:    Problem List Items Addressed This Visit     Vitamin D deficiency    Relevant Medications    Cholecalciferol (Vitamin D3) 50 MCG (2000 UT) TABS    ergocalciferol (VITAMIN D2) 50,000 units    Class 1 obesity due to excess calories without serious comorbidity with body mass index (BMI) of 32 0 to 32 9 in adult    Influenza vaccination declined      Other Visit Diagnoses     Acute non-recurrent maxillary sinusitis    -  Primary    Relevant Medications    methylPREDNISolone 4 MG tablet therapy pack           Diagnoses and all orders for this visit:    Acute non-recurrent maxillary sinusitis  -     Discontinue: methylPREDNISolone 4 MG tablet therapy pack; Use as directed on package  -     methylPREDNISolone 4 MG tablet therapy pack; Use as directed on package    Vitamin D deficiency  -     Cholecalciferol (Vitamin D3) 50 MCG (2000 UT) TABS; Take 1 tablet (2,000 Units total) by mouth daily Take after Vit D 50,000 weekly is completed  -     ergocalciferol (VITAMIN D2) 50,000 units; Take 1 capsule (50,000 Units total) by mouth once a week    Influenza vaccination declined    Class 1 obesity due to excess calories without serious comorbidity with body mass index (BMI) of 33 0 to 33 9 in adult    Other orders  -     Cancel: TDAP Vaccine greater than or equal to 8yo  -     Cancel: influenza vaccine, quadrivalent, recombinant, PF, 0 5 mL, for patients 18 yr+ (FLUBLOK)  -     Discontinue: Vitamin D, Ergocalciferol, 50 MCG (2000 UT) CAPS; Take 1 caplet by mouth daily        No problem-specific Assessment & Plan notes found for this encounter  Subjective:      Patient ID: Arianne Matt is a 27 y o  female  Sinusitis  This is a new problem  The current episode started in the past 7 days  There has been no fever  The pain is mild  Associated symptoms include congestion and sinus pressure   Pertinent negatives include no chills, coughing, diaphoresis, ear pain, headaches, hoarse voice, neck pain, shortness of breath, sneezing, sore throat or swollen glands  Past treatments include spray decongestants and saline sprays (zyrtec-D)  The treatment provided mild relief  The following portions of the patient's history were reviewed and updated as appropriate:   She has a past medical history of Deviated septum, Eczema, Recurrent sinus infections, Seizures ([address] ), and Wears glasses  ,  does not have any pertinent problems on file  ,   has a past surgical history that includes Nose surgery (2017); Lymph node dissection; Appendectomy; pr stereotactic comp assist proc,cranial,extradural (N/A, 2/28/2018); pr nasal/sinus ndsc w/total ethoidectomy (N/A, 2/28/2018); and pr repair of nasal septum (N/A, 2/28/2018)  ,  family history includes No Known Problems in her father and mother  ,   reports that she has never smoked  She has never used smokeless tobacco  She reports current alcohol use  She reports that she does not use drugs  ,  is allergic to shellfish-derived products     Current Outpatient Medications   Medication Sig Dispense Refill    EPINEPHrine (EPIPEN) 0 3 mg/0 3 mL SOAJ Inject 0 3 mg into the shoulder, thigh, or buttocks once      Tri-Estarylla 0 18/0 215/0 25 MG-35 MCG per tablet TAKE 1 TABLET BY MOUTH  DAILY 84 tablet 3    Cholecalciferol (Vitamin D3) 50 MCG (2000 UT) TABS Take 1 tablet (2,000 Units total) by mouth daily Take after Vit D 50,000 weekly is completed 90 tablet 3    ergocalciferol (VITAMIN D2) 50,000 units Take 1 capsule (50,000 Units total) by mouth once a week 12 capsule 1    methylPREDNISolone 4 MG tablet therapy pack Use as directed on package 21 each 0     No current facility-administered medications for this visit  BMI Counseling: Body mass index is 33 7 kg/m²  The BMI is above normal  Nutrition recommendations include decreasing portion sizes, consuming healthier snacks, limiting drinks that contain sugar and moderation in carbohydrate intake  No pharmacotherapy was ordered  Depression Screening and Follow-up Plan: Patient's depression screening was positive with a PHQ-2 score of 0  Clincally patient does not have depression  No treatment is required  Review of Systems   Constitutional: Negative  Negative for chills and diaphoresis  HENT: Positive for congestion and sinus pressure  Negative for ear pain, hoarse voice, sneezing and sore throat  Eyes: Negative  Respiratory: Negative  Negative for cough and shortness of breath  Cardiovascular: Negative  Gastrointestinal: Negative  Endocrine: Negative  Genitourinary: Negative  Musculoskeletal: Negative  Negative for neck pain  Skin: Negative  Allergic/Immunologic: Negative  Neurological: Negative  Negative for headaches  Hematological: Negative  Psychiatric/Behavioral: Negative  Objective:  Vitals:    03/16/21 1451   BP: 128/78   BP Location: Left arm   Patient Position: Sitting   Cuff Size: Large   Pulse: 104   Temp: (!) 97 1 °F (36 2 °C)   TempSrc: Tympanic   SpO2: 97%   Weight: 94 7 kg (208 lb 12 8 oz)   Height: 5' 6" (1 676 m)     Body mass index is 33 7 kg/m²  Physical Exam  Vitals signs and nursing note reviewed  Constitutional:       Appearance: Normal appearance  She is well-developed  HENT:      Head: Normocephalic and atraumatic  Right Ear: Tympanic membrane, ear canal and external ear normal       Left Ear: Tympanic membrane, ear canal and external ear normal       Nose: Nose normal       Mouth/Throat:      Mouth: Mucous membranes are moist       Pharynx: Uvula midline  Eyes:      General: Lids are normal       Conjunctiva/sclera: Conjunctivae normal       Pupils: Pupils are equal, round, and reactive to light  Neck:      Musculoskeletal: Full passive range of motion without pain, normal range of motion and neck supple  Thyroid: No thyroid mass or thyromegaly  Vascular: No JVD        Trachea: Trachea and phonation normal    Cardiovascular: Rate and Rhythm: Normal rate and regular rhythm  Pulses: Normal pulses  Heart sounds: Normal heart sounds, S1 normal and S2 normal  No murmur  No friction rub  No gallop  Pulmonary:      Effort: Pulmonary effort is normal       Breath sounds: Normal breath sounds  Abdominal:      General: Bowel sounds are normal       Palpations: Abdomen is soft  Tenderness: There is no abdominal tenderness  Genitourinary:     Comments: Deferred   Musculoskeletal: Normal range of motion  Right lower leg: No edema  Left lower leg: No edema  Skin:     General: Skin is warm and dry  Capillary Refill: Capillary refill takes less than 2 seconds  Neurological:      General: No focal deficit present  Mental Status: She is alert and oriented to person, place, and time  Cranial Nerves: Cranial nerves are intact  No cranial nerve deficit  Sensory: Sensation is intact  Motor: Motor function is intact  Coordination: Coordination is intact  Gait: Gait is intact  Deep Tendon Reflexes: Reflexes are normal and symmetric  Psychiatric:         Attention and Perception: Attention and perception normal          Mood and Affect: Mood and affect normal          Speech: Speech normal          Behavior: Behavior normal  Behavior is cooperative  Thought Content:  Thought content normal          Cognition and Memory: Cognition normal          Judgment: Judgment normal

## 2021-03-17 ENCOUNTER — VBI (OUTPATIENT)
Dept: ADMINISTRATIVE | Facility: OTHER | Age: 31
End: 2021-03-17

## 2021-03-31 DIAGNOSIS — Z23 ENCOUNTER FOR IMMUNIZATION: ICD-10-CM

## 2021-08-18 ENCOUNTER — VBI (OUTPATIENT)
Dept: ADMINISTRATIVE | Facility: OTHER | Age: 31
End: 2021-08-18

## 2021-08-20 DIAGNOSIS — Z30.41 ENCOUNTER FOR SURVEILLANCE OF CONTRACEPTIVE PILLS: ICD-10-CM

## 2021-08-23 RX ORDER — NORGESTIMATE AND ETHINYL ESTRADIOL 7DAYSX3 28
KIT ORAL
Qty: 84 TABLET | Refills: 3 | Status: SHIPPED | OUTPATIENT
Start: 2021-08-23 | End: 2022-06-17

## 2021-09-17 ENCOUNTER — TELEPHONE (OUTPATIENT)
Dept: FAMILY MEDICINE CLINIC | Facility: CLINIC | Age: 31
End: 2021-09-17

## 2021-09-24 ENCOUNTER — APPOINTMENT (OUTPATIENT)
Dept: LAB | Facility: CLINIC | Age: 31
End: 2021-09-24
Payer: COMMERCIAL

## 2021-09-24 DIAGNOSIS — R68.89 COLD INTOLERANCE: ICD-10-CM

## 2021-09-24 LAB — TSH SERPL DL<=0.05 MIU/L-ACNC: 1.88 UIU/ML (ref 0.36–3.74)

## 2021-09-24 PROCEDURE — 36415 COLL VENOUS BLD VENIPUNCTURE: CPT

## 2021-09-24 PROCEDURE — 84443 ASSAY THYROID STIM HORMONE: CPT

## 2021-10-04 ENCOUNTER — HOSPITAL ENCOUNTER (EMERGENCY)
Facility: HOSPITAL | Age: 31
Discharge: HOME | End: 2021-10-04
Attending: EMERGENCY MEDICINE | Admitting: EMERGENCY MEDICINE
Payer: COMMERCIAL

## 2021-10-04 ENCOUNTER — APPOINTMENT (EMERGENCY)
Dept: RADIOLOGY | Facility: HOSPITAL | Age: 31
End: 2021-10-04
Payer: COMMERCIAL

## 2021-10-04 VITALS
HEART RATE: 91 BPM | RESPIRATION RATE: 18 BRPM | BODY MASS INDEX: 34.75 KG/M2 | TEMPERATURE: 98.7 F | HEIGHT: 65 IN | OXYGEN SATURATION: 100 % | SYSTOLIC BLOOD PRESSURE: 147 MMHG | DIASTOLIC BLOOD PRESSURE: 85 MMHG | WEIGHT: 208.56 LBS

## 2021-10-04 DIAGNOSIS — S99.922A INJURY OF LEFT FOOT, INITIAL ENCOUNTER: Primary | ICD-10-CM

## 2021-10-04 PROCEDURE — 99283 EMERGENCY DEPT VISIT LOW MDM: CPT

## 2021-10-04 PROCEDURE — 99284 EMERGENCY DEPT VISIT MOD MDM: CPT | Performed by: EMERGENCY MEDICINE

## 2021-10-04 PROCEDURE — 73630 X-RAY EXAM OF FOOT: CPT

## 2021-10-04 RX ORDER — IBUPROFEN 600 MG/1
600 TABLET ORAL ONCE
Status: COMPLETED | OUTPATIENT
Start: 2021-10-04 | End: 2021-10-04

## 2021-10-04 RX ADMIN — IBUPROFEN 600 MG: 600 TABLET ORAL at 22:57

## 2021-10-06 ENCOUNTER — RA CDI HCC (OUTPATIENT)
Dept: OTHER | Facility: HOSPITAL | Age: 31
End: 2021-10-06

## 2021-10-06 ENCOUNTER — VBI (OUTPATIENT)
Dept: ADMINISTRATIVE | Facility: OTHER | Age: 31
End: 2021-10-06

## 2021-10-13 ENCOUNTER — OFFICE VISIT (OUTPATIENT)
Dept: FAMILY MEDICINE CLINIC | Facility: CLINIC | Age: 31
End: 2021-10-13
Payer: COMMERCIAL

## 2021-10-13 VITALS
OXYGEN SATURATION: 97 % | DIASTOLIC BLOOD PRESSURE: 86 MMHG | RESPIRATION RATE: 18 BRPM | WEIGHT: 203 LBS | HEIGHT: 65 IN | HEART RATE: 91 BPM | TEMPERATURE: 97.3 F | BODY MASS INDEX: 33.82 KG/M2 | SYSTOLIC BLOOD PRESSURE: 132 MMHG

## 2021-10-13 DIAGNOSIS — Z00.00 ANNUAL PHYSICAL EXAM: Primary | ICD-10-CM

## 2021-10-13 DIAGNOSIS — Z13.0 SCREENING FOR DEFICIENCY ANEMIA: ICD-10-CM

## 2021-10-13 DIAGNOSIS — E66.09 CLASS 1 OBESITY DUE TO EXCESS CALORIES WITHOUT SERIOUS COMORBIDITY WITH BODY MASS INDEX (BMI) OF 33.0 TO 33.9 IN ADULT: ICD-10-CM

## 2021-10-13 DIAGNOSIS — Z13.29 SCREENING FOR THYROID DISORDER: ICD-10-CM

## 2021-10-13 DIAGNOSIS — Z13.220 SCREENING FOR HYPERLIPIDEMIA: ICD-10-CM

## 2021-10-13 DIAGNOSIS — Z11.59 ENCOUNTER FOR HEPATITIS C SCREENING TEST FOR LOW RISK PATIENT: ICD-10-CM

## 2021-10-13 DIAGNOSIS — Z23 ENCOUNTER FOR IMMUNIZATION: ICD-10-CM

## 2021-10-13 DIAGNOSIS — Z13.31 DEPRESSION SCREENING NEGATIVE: ICD-10-CM

## 2021-10-13 DIAGNOSIS — Z13.1 SCREENING FOR DIABETES MELLITUS: ICD-10-CM

## 2021-10-13 PROCEDURE — 99395 PREV VISIT EST AGE 18-39: CPT | Performed by: NURSE PRACTITIONER

## 2021-10-13 PROCEDURE — 90715 TDAP VACCINE 7 YRS/> IM: CPT

## 2021-10-13 PROCEDURE — 90471 IMMUNIZATION ADMIN: CPT

## 2021-10-13 PROCEDURE — 3008F BODY MASS INDEX DOCD: CPT | Performed by: NURSE PRACTITIONER

## 2021-10-13 PROCEDURE — 3725F SCREEN DEPRESSION PERFORMED: CPT | Performed by: NURSE PRACTITIONER

## 2021-10-13 PROCEDURE — 1036F TOBACCO NON-USER: CPT | Performed by: NURSE PRACTITIONER

## 2021-10-15 ENCOUNTER — TELEPHONE (OUTPATIENT)
Dept: FAMILY MEDICINE CLINIC | Facility: CLINIC | Age: 31
End: 2021-10-15

## 2021-12-14 ENCOUNTER — VBI (OUTPATIENT)
Dept: ADMINISTRATIVE | Facility: OTHER | Age: 31
End: 2021-12-14

## 2022-03-10 ENCOUNTER — VBI (OUTPATIENT)
Dept: ADMINISTRATIVE | Facility: OTHER | Age: 32
End: 2022-03-10

## 2022-04-15 ENCOUNTER — RA CDI HCC (OUTPATIENT)
Dept: OTHER | Facility: HOSPITAL | Age: 32
End: 2022-04-15

## 2022-04-15 NOTE — PROGRESS NOTES
[facility]  [facility]       Chart reviewed, no opportunity found: CHART REVIEWED, NO OPPORTUNITY FOUND        Patients Insurance        Commercial Insurance: [address] [place] [address]

## 2022-06-16 DIAGNOSIS — Z30.41 ENCOUNTER FOR SURVEILLANCE OF CONTRACEPTIVE PILLS: ICD-10-CM

## 2022-06-17 RX ORDER — NORGESTIMATE AND ETHINYL ESTRADIOL 7DAYSX3 28
KIT ORAL
Qty: 84 TABLET | Refills: 3 | Status: SHIPPED | OUTPATIENT
Start: 2022-06-17

## 2022-08-02 ENCOUNTER — VBI (OUTPATIENT)
Dept: ADMINISTRATIVE | Facility: OTHER | Age: 32
End: 2022-08-02

## 2022-09-27 ENCOUNTER — APPOINTMENT (OUTPATIENT)
Dept: URGENT CARE | Age: 32
End: 2022-09-27
Payer: OTHER MISCELLANEOUS

## 2022-09-27 PROCEDURE — G0382 LEV 3 HOSP TYPE B ED VISIT: HCPCS

## 2022-09-27 PROCEDURE — 99283 EMERGENCY DEPT VISIT LOW MDM: CPT

## 2022-09-30 ENCOUNTER — APPOINTMENT (OUTPATIENT)
Dept: URGENT CARE | Age: 32
End: 2022-09-30
Payer: OTHER MISCELLANEOUS

## 2022-09-30 PROCEDURE — 99213 OFFICE O/P EST LOW 20 MIN: CPT | Performed by: PREVENTIVE MEDICINE

## 2022-12-13 ENCOUNTER — VBI (OUTPATIENT)
Dept: ADMINISTRATIVE | Facility: OTHER | Age: 32
End: 2022-12-13

## 2023-01-12 ENCOUNTER — VBI (OUTPATIENT)
Dept: ADMINISTRATIVE | Facility: OTHER | Age: 33
End: 2023-01-12

## 2023-04-26 ENCOUNTER — VBI (OUTPATIENT)
Dept: ADMINISTRATIVE | Facility: OTHER | Age: 33
End: 2023-04-26

## 2023-04-28 DIAGNOSIS — Z30.41 ENCOUNTER FOR SURVEILLANCE OF CONTRACEPTIVE PILLS: ICD-10-CM

## 2023-05-01 RX ORDER — NORGESTIMATE AND ETHINYL ESTRADIOL 7DAYSX3 28
KIT ORAL
Qty: 84 TABLET | Refills: 3 | Status: SHIPPED | OUTPATIENT
Start: 2023-05-01

## 2023-06-04 ENCOUNTER — OFFICE VISIT (OUTPATIENT)
Dept: URGENT CARE | Facility: CLINIC | Age: 33
End: 2023-06-04
Payer: COMMERCIAL

## 2023-06-04 ENCOUNTER — APPOINTMENT (OUTPATIENT)
Dept: RADIOLOGY | Facility: CLINIC | Age: 33
End: 2023-06-04
Payer: COMMERCIAL

## 2023-06-04 VITALS
RESPIRATION RATE: 18 BRPM | OXYGEN SATURATION: 98 % | TEMPERATURE: 97.9 F | HEART RATE: 116 BPM | DIASTOLIC BLOOD PRESSURE: 74 MMHG | SYSTOLIC BLOOD PRESSURE: 119 MMHG

## 2023-06-04 DIAGNOSIS — S20.212A RIB CONTUSION, LEFT, INITIAL ENCOUNTER: Primary | ICD-10-CM
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
## 2023-06-04 PROCEDURE — 99213 OFFICE O/P EST LOW 20 MIN: CPT | Performed by: STUDENT IN AN ORGANIZED HEALTH CARE EDUCATION/TRAINING PROGRAM

## 2023-06-04 PROCEDURE — 71101 X-RAY EXAM UNILAT RIBS/CHEST: CPT

## 2023-06-04 NOTE — LETTER
June 4, 2023     Patient: Jodee Guerrero   YOB: 1990   Date of Visit: 6/4/2023       To Whom It May Concern: It is my medical opinion that Catherine Ovalles  may return to work on 6/6/2023   If you have any questions or concerns, please don't hesitate to call           Sincerely,        Angelique Herrera DO    CC: No Recipients

## 2023-06-05 NOTE — PROGRESS NOTES
[facility]        NAME: George Esposito is a 28 y o  female  : 1990    MRN: [id number]  DATE: 2023  TIME: 8:18 PM    Assessment and Orders   Rib contusion, left, initial encounter [[id number]]  0  Rib contusion, left, initial encounter  XR ribs left w pa chest min 3 views            Plan and Discussion      Symptoms and exam consistent with rib contusion  No osseous abnormality on xray  Follow up with final radiology read  Discussed importance of deep breaths  Discussed supportive care  Discussed ED precautions including (but not limited to)  • Difficultly breathing or shortness of breath  • Chest pain  • Acutely worsening symptoms  Risks and benefits discussed  Patient understands and agrees with the plan  Follow up with PCP  Chief Complaint     Chief Complaint   Patient presents with   • rib pain     Was standing on a metal folding chair, chair collapsed, fell right into chair, happened today, left lower ribcage, pain 7/10           History of Present Illness       Patient fell on a metal folding chair and his her left side on the top of the chair  Has pain with deep breaths  No bruising  No lacerations  No abdominal pain  Denies trauma to the head         Review of Systems   Review of Systems  As stated in HPI     Current Medications       Current Outpatient Medications:   •  EPINEPHrine (EPIPEN) 0 3 mg/0 3 mL SOAJ, Inject 0 3 mg into a muscle once , Disp: , Rfl:   •  Tri-Estarylla 0 18/0 215/0 25 MG-35 MCG per tablet, TAKE 1 TABLET BY MOUTH  DAILY, Disp: 84 tablet, Rfl: 3    Current Allergies     Allergies as of 2023 - Reviewed 2023   Allergen Reaction Noted   • Shellfish-derived products - food allergy Shortness Of Breath 2018            The following portions of the patient's history were reviewed and updated as appropriate: allergies, current medications, past family history, past medical history, past social history, past surgical history and "problem list      Past Medical History:   Diagnosis Date   • Deviated septum    • Eczema    • Recurrent sinus infections    • Seizures (HCC)     febrile as a child \"grew out of them\"   • Wears glasses        Past Surgical History:   Procedure Laterality Date   • APPENDECTOMY     • LYMPH NODE DISSECTION      \"in neck completely removed age 11\"   • NOSE SURGERY  2017   • TN NASAL/SINUS NDSC W/TOTAL ETHOIDECTOMY N/A 2/28/2018    Procedure: PARTIAL INFERIOR TURBINATE REDUCTION;  Surgeon: Vanessa Rivas DO;  [facility];  Service: ENT   • TN SEPTOPLASTY/SUBMUCOUS RESECJ W/WO CARTILAGE GRF N/A 2/28/2018    Procedure: SEPTOPLASTY WITH REMOVAL OF RIGHT FLOOR SPUR;  Surgeon: Vanessa Rivas DO;  [facility];  Service: ENT   • TN STRTCTC CPTR ASSTD PX EXTRADURAL CRANIAL N/A 2/28/2018    Procedure: FUNCTIONAL ENDOSCOPIC SINUS SURGERY (FESS) IMAGED GUIDED; Surgeon: Vanessa Rivas DO;  [facility];  Service: ENT       Family History   Problem Relation Age of Onset   • No Known Problems Mother    • No Known Problems Father          Medications have been verified  Objective   /74 (BP Location: Left arm, Patient Position: Standing)   Pulse (!) 116   Temp 97 9 °F (36 6 °C)   Resp 18   SpO2 98%   No LMP recorded  Physical Exam     Physical Exam  Constitutional:       General: She is not in acute distress  Cardiovascular:      Rate and Rhythm: Normal rate  Pulmonary:      Effort: No respiratory distress  Breath sounds: No wheezing or rhonchi  Chest:      Chest wall: Tenderness present  Comments: Area of tenderness  No subcutaneous air or bruising  Rib cage movement is symmetrical  No bruising or wounds  Neurological:      General: No focal deficit present  Mental Status: She is alert and oriented to person, place, and time     Psychiatric:         Mood and Affect: Mood normal          Behavior: Behavior normal                Feliberto Ahumada DO       "

## 2023-07-26 ENCOUNTER — VBI (OUTPATIENT)
Dept: ADMINISTRATIVE | Facility: OTHER | Age: 33
End: 2023-07-26

## 2023-09-11 ENCOUNTER — OFFICE VISIT (OUTPATIENT)
Dept: URGENT CARE | Facility: CLINIC | Age: 33
End: 2023-09-11
Payer: COMMERCIAL

## 2023-09-11 VITALS
RESPIRATION RATE: 18 BRPM | SYSTOLIC BLOOD PRESSURE: 142 MMHG | TEMPERATURE: 98.2 F | HEART RATE: 102 BPM | OXYGEN SATURATION: 100 % | DIASTOLIC BLOOD PRESSURE: 76 MMHG

## 2023-09-11 DIAGNOSIS — M79.672 LEFT FOOT PAIN: Primary | ICD-10-CM

## 2023-09-11 PROCEDURE — 99213 OFFICE O/P EST LOW 20 MIN: CPT | Performed by: PHYSICIAN ASSISTANT

## 2023-09-11 NOTE — PROGRESS NOTES
[facility]        NAME: Jaiden Germain is a 28 y.o. female  : 1990    MRN: [id number]  DATE: 2023  TIME: 11:53 AM    Assessment and Plan   Left foot pain [M79.672]  1. Left foot pain              Per [place] foot and ankle score no imaging warranted at this time    Patient Instructions     Patient Instructions   Recommend continuing gentle rolling of affected area with frozen water bottle. Can apply OTC Voltaren gel. OTC ibuprofen/tylenol. Follow up podiatry. Follow up with PCP in 3-5 days. Proceed to  ER if symptoms worsen. Chief Complaint     Chief Complaint   Patient presents with   • Foot Pain     Left side  around bottom of foot          History of Present Illness       Male presenting today with foot pain x1 week. Patient notes over the last few days she has had progressively worsening pain and discomfort on the bottom of her left foot, notes the pain starts at the base of her heel and radiates up the left side of her foot, has been rolling the area out with a tennis ball and occasionally a iced water bottle but notes no change or improvement of her symptoms. Believes her symptoms may be related to plantars fasciitis, has a longstanding history of left ankle issues, does see a podiatrist.  Notes that she works at [employer] and is on her feet for long periods at a time which seem to make her symptoms worse. Denies any trauma or injury to left foot or ankle. Notes that she is needing a note for work. Denies bruising, swelling, numbness, tingling, inability to bear weight. Review of Systems   Review of Systems   Constitutional: Negative for chills and fever. HENT: Negative for ear pain and sore throat. Eyes: Negative for pain and visual disturbance. Respiratory: Negative for cough and shortness of breath. Cardiovascular: Negative for chest pain and palpitations. Gastrointestinal: Negative for abdominal pain and vomiting.    Genitourinary: Negative for dysuria and hematuria. Musculoskeletal:        See HPI   Skin:        See HPI   Neurological: Negative for seizures and syncope. All other systems reviewed and are negative. Current Medications       Current Outpatient Medications:   •  EPINEPHrine (EPIPEN) 0.3 mg/0.3 mL SOAJ, Inject 0.3 mg into a muscle once , Disp: , Rfl:   •  Tri-Estarylla 0.18/0.215/0.25 MG-35 MCG per tablet, TAKE 1 TABLET BY MOUTH  DAILY, Disp: 84 tablet, Rfl: 3    Current Allergies     Allergies as of 09/11/2023 - Reviewed 09/11/2023   Allergen Reaction Noted   • Shellfish-derived products - food allergy Shortness Of Breath 02/22/2018            The following portions of the patient's history were reviewed and updated as appropriate: allergies, current medications, past family history, past medical history, past social history, past surgical history and problem list.     Past Medical History:   Diagnosis Date   • Deviated septum    • Eczema    • Recurrent sinus infections    • Seizures ([address])     febrile as a child "grew out of them"   • Wears glasses        Past Surgical History:   Procedure Laterality Date   • APPENDECTOMY     • LYMPH NODE DISSECTION      "in neck completely removed age 11"   • NOSE SURGERY  2017   • [place] NASAL/SINUS NDSC W/TOTAL ETHOIDECTOMY N/A 2/28/2018    Procedure: PARTIAL INFERIOR TURBINATE REDUCTION;  Surgeon: Tammie Ochoa DO;  [facility];  Service: ENT   • [place] SEPTOPLASTY/SUBMUCOUS RESECJ W/WO CARTILAGE GRF N/A 2/28/2018    Procedure: SEPTOPLASTY WITH REMOVAL OF RIGHT FLOOR SPUR;  Surgeon: Tammie Ochoa DO;  [facility];  Service: ENT   • [place] STRTCTC CPTR ASSTD PX EXTRADURAL CRANIAL N/A 2/28/2018    Procedure: FUNCTIONAL ENDOSCOPIC SINUS SURGERY (FESS) IMAGED GUIDED; Surgeon:  Tammie Ochoa DO;  [facility];  Service: ENT       Family History   Problem Relation Age of Onset   • No Known Problems Mother    • No Known Problems Father          Medications have been verified. Objective   /76   Pulse 102   Temp 98.2 °F (36.8 °C)   Resp 18   SpO2 100%        Physical Exam     Physical Exam  Vitals and nursing note reviewed. Constitutional:       Appearance: Normal appearance. HENT:      Head: Normocephalic. Cardiovascular:      Rate and Rhythm: Normal rate. Pulses: Normal pulses. Pulmonary:      Effort: Pulmonary effort is normal.   Musculoskeletal:      Comments: No obvious deformity of left foot or ankle, no bruising, no swelling, mild TTP at base of left heel and along the lateral dorsal aspect of foot, 2+ dorsalis pedis and posterior tibialis pulses of left foot and ankle, gross sensation of left foot intact. No medial or lateral malleoli tenderness, no midfoot tenderness. Skin:     General: Skin is warm. Neurological:      Mental Status: She is alert.

## 2023-09-11 NOTE — PATIENT INSTRUCTIONS
Recommend continuing gentle rolling of affected area with frozen water bottle. Can apply OTC Voltaren gel. OTC ibuprofen/tylenol. Follow up podiatry.

## 2023-11-24 ENCOUNTER — VBI (OUTPATIENT)
Dept: ADMINISTRATIVE | Facility: OTHER | Age: 33
End: 2023-11-24

## 2024-02-01 ENCOUNTER — OFFICE VISIT (OUTPATIENT)
Dept: URGENT CARE | Facility: CLINIC | Age: 34
End: 2024-02-01
Payer: COMMERCIAL

## 2024-02-01 VITALS
SYSTOLIC BLOOD PRESSURE: 127 MMHG | DIASTOLIC BLOOD PRESSURE: 72 MMHG | OXYGEN SATURATION: 100 % | TEMPERATURE: 98.1 F | RESPIRATION RATE: 18 BRPM | HEART RATE: 99 BPM

## 2024-02-01 DIAGNOSIS — S46.811A TRAPEZIUS STRAIN, RIGHT, INITIAL ENCOUNTER: Primary | ICD-10-CM

## 2024-02-01 PROCEDURE — 99213 OFFICE O/P EST LOW 20 MIN: CPT | Performed by: PHYSICIAN ASSISTANT

## 2024-02-01 RX ORDER — METHYLPREDNISOLONE 4 MG/1
TABLET ORAL
Qty: 21 TABLET | Refills: 0 | Status: SHIPPED | OUTPATIENT
Start: 2024-02-01

## 2024-02-01 RX ORDER — CYCLOBENZAPRINE HCL 10 MG
10 TABLET ORAL
Qty: 14 TABLET | Refills: 0 | Status: SHIPPED | OUTPATIENT
Start: 2024-02-01 | End: 2024-02-15

## 2024-02-01 NOTE — LETTER
February 1, 2024     Patient: Steph Maier   YOB: 1990   Date of Visit: 2/1/2024       To Whom It May Concern:    It is my medical opinion that Steph Maier may return to work on 02/03/2024 . Please excuse any absences until this time.    If you have any questions or concerns, please don't hesitate to call.         Sincerely,        Bro Rawls PA-C    CC: No Recipients

## 2024-02-01 NOTE — PROGRESS NOTES
[facility]        NAME: Steph Maier is a 33 y.o. female  : 1990    MRN: [id number]  DATE: 2024  TIME: 1:20 PM    Assessment and Plan   Trapezius strain, right, initial encounter [S46.811A]  1. Trapezius strain, right, initial encounter  methylPREDNISolone 4 MG tablet therapy pack    cyclobenzaprine (FLEXERIL) 10 mg tablet            Patient Instructions     Patient Instructions   Symptoms consistent with muscular strain/sprain.  May have been precipitated by awkward position while sleeping.  Will treat with Medrol Dosepak and Flexeril.  Strongly encourage gentle stretching of area.  All patient's questions answered.      Follow up with PCP in 3-5 days.  Proceed to  ER if symptoms worsen.    Chief Complaint     Chief Complaint   Patient presents with    Shoulder Pain     Right shoulder pain yesterday throbbing and stabbing pain no injury         History of Present Illness       Patient is a 33-year-old female presenting today with right upper back pain x 1 day.  Patient notes yesterday she awoke with some pain and discomfort of her right upper back, states it was not very significant but felt some discomfort around her right shoulder, took some Tylenol which provided some relief, notes that she went to bed last night and was awoken around 230 this morning with worsening pain and discomfort of the same area, notes it did feel like a stabbing sensation when present however the pain seems to come and go.  Denies any trauma or injury to the area.  Denies numbness, tingling, weakness, bruising, swelling.  Does report she is missing work today needing a note.        Review of Systems   Review of Systems   Constitutional:  Negative for chills and fever.   HENT:  Negative for ear pain and sore throat.    Eyes:  Negative for pain and visual disturbance.   Respiratory:  Negative for cough and shortness of breath.    Cardiovascular:  Negative for chest pain and palpitations.  "  Gastrointestinal:  Negative for abdominal pain and vomiting.   Genitourinary:  Negative for dysuria and hematuria.   Musculoskeletal:         See HPI   Skin:  Negative for color change and rash.   Neurological:  Negative for seizures and syncope.   All other systems reviewed and are negative.        Current Medications       Current Outpatient Medications:     cyclobenzaprine (FLEXERIL) 10 mg tablet, Take 1 tablet (10 mg total) by mouth daily at bedtime for 14 days, Disp: 14 tablet, Rfl: 0    methylPREDNISolone 4 MG tablet therapy pack, Use as directed on package, Disp: 21 tablet, Rfl: 0    EPINEPHrine (EPIPEN) 0.3 mg/0.3 mL SOAJ, Inject 0.3 mg into a muscle once , Disp: , Rfl:     Tri-Estarylla 0.18/0.215/0.25 MG-35 MCG per tablet, TAKE 1 TABLET BY MOUTH  DAILY, Disp: 84 tablet, Rfl: 3    Current Allergies     Allergies as of 02/01/2024 - Reviewed 02/01/2024   Allergen Reaction Noted    Shellfish-derived products - food allergy Shortness Of Breath 02/22/2018            The following portions of the patient's history were reviewed and updated as appropriate: allergies, current medications, past family history, past medical history, past social history, past surgical history and problem list.     Past Medical History:   Diagnosis Date    Deviated septum     Eczema     Recurrent sinus infections     Seizures (HCC)     febrile as a child \"grew out of them\"    Wears glasses        Past Surgical History:   Procedure Laterality Date    APPENDECTOMY      LYMPH NODE DISSECTION      \"in neck completely removed age 5\"    NOSE SURGERY  2017    [place] NASAL/SINUS NDSC W/TOTAL ETHOIDECTOMY N/A 2/28/2018    Procedure: PARTIAL INFERIOR TURBINATE REDUCTION;  Surgeon: Eros Gonzalez DO;  [facility];  Service: ENT    [place] SEPTOPLASTY/SUBMUCOUS RESECJ W/WO CARTILAGE GRF N/A 2/28/2018    Procedure: SEPTOPLASTY WITH REMOVAL OF RIGHT FLOOR SPUR;  Surgeon: Eros Gonzalez DO;  [facility];  Service: ENT    [place] STRTCTC " CPTR ASSTD PX EXTRADURAL CRANIAL N/A 2/28/2018    Procedure: FUNCTIONAL ENDOSCOPIC SINUS SURGERY (FESS) IMAGED GUIDED;  Surgeon: Eros Gonzalez DO;  [facility];  Service: ENT       Family History   Problem Relation Age of Onset    No Known Problems Mother     No Known Problems Father          Medications have been verified.        Objective   /72   Pulse 99   Temp 98.1 °F (36.7 °C)   Resp 18   SpO2 100%        Physical Exam     Physical Exam  Vitals and nursing note reviewed.   Constitutional:       General: She is not in acute distress.     Appearance: Normal appearance.   HENT:      Head: Normocephalic.   Cardiovascular:      Rate and Rhythm: Normal rate.      Pulses: Normal pulses.   Pulmonary:      Effort: Pulmonary effort is normal.   Musculoskeletal:      Cervical back: Normal range of motion.      Comments: Obvious deformity of right shoulder or right upper back, no bruising, no swelling, diffuse TTP to right trapezius, no spinous process tenderness.  Full ROM of upper extremities bilaterally with no discernible discomfort, normal strength of upper extremities bilaterally   Skin:     General: Skin is warm.      Capillary Refill: Capillary refill takes less than 2 seconds.   Neurological:      Mental Status: She is alert.

## 2024-03-09 DIAGNOSIS — Z30.41 ENCOUNTER FOR SURVEILLANCE OF CONTRACEPTIVE PILLS: ICD-10-CM

## 2024-03-14 RX ORDER — NORGESTIMATE AND ETHINYL ESTRADIOL
1 KIT DAILY
Qty: 84 TABLET | Refills: 3 | Status: SHIPPED | OUTPATIENT
Start: 2024-03-14

## 2024-05-22 ENCOUNTER — PROCEDURE VISIT (OUTPATIENT)
Dept: NEUROLOGY | Facility: CLINIC | Age: 34
End: 2024-05-22
Payer: COMMERCIAL

## 2024-05-22 ENCOUNTER — TELEPHONE (OUTPATIENT)
Dept: NEUROLOGY | Facility: CLINIC | Age: 34
End: 2024-05-22

## 2024-05-22 DIAGNOSIS — M21.372 LEFT FOOT DROP: ICD-10-CM

## 2024-05-22 PROCEDURE — 95910 NRV CNDJ TEST 7-8 STUDIES: CPT | Performed by: PHYSICAL MEDICINE & REHABILITATION

## 2024-05-22 PROCEDURE — 95886 MUSC TEST DONE W/N TEST COMP: CPT | Performed by: PHYSICAL MEDICINE & REHABILITATION

## 2024-10-31 ENCOUNTER — APPOINTMENT (EMERGENCY)
Dept: RADIOLOGY | Facility: HOSPITAL | Age: 34
End: 2024-10-31
Payer: COMMERCIAL

## 2024-10-31 ENCOUNTER — APPOINTMENT (EMERGENCY)
Dept: CT IMAGING | Facility: HOSPITAL | Age: 34
End: 2024-10-31
Payer: COMMERCIAL

## 2024-10-31 ENCOUNTER — HOSPITAL ENCOUNTER (EMERGENCY)
Facility: HOSPITAL | Age: 34
Discharge: HOME | End: 2024-11-01
Payer: COMMERCIAL

## 2024-10-31 DIAGNOSIS — S09.90XA CLOSED HEAD INJURY, INITIAL ENCOUNTER: ICD-10-CM

## 2024-10-31 DIAGNOSIS — R93.0 ABNORMAL CT OF THE HEAD: ICD-10-CM
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
## 2024-10-31 DIAGNOSIS — R11.0 NAUSEA: ICD-10-CM

## 2024-10-31 PROCEDURE — 99285 EMERGENCY DEPT VISIT HI MDM: CPT

## 2024-10-31 PROCEDURE — 72131 CT LUMBAR SPINE W/O DYE: CPT

## 2024-10-31 PROCEDURE — 70450 CT HEAD/BRAIN W/O DYE: CPT

## 2024-10-31 PROCEDURE — 72125 CT NECK SPINE W/O DYE: CPT

## 2024-10-31 PROCEDURE — 99284 EMERGENCY DEPT VISIT MOD MDM: CPT

## 2024-10-31 PROCEDURE — 73030 X-RAY EXAM OF SHOULDER: CPT

## 2024-10-31 RX ORDER — ONDANSETRON 4 MG/1
4 TABLET, ORALLY DISINTEGRATING ORAL ONCE
Status: COMPLETED | OUTPATIENT
Start: 2024-10-31 | End: 2024-10-31

## 2024-10-31 RX ADMIN — ONDANSETRON 4 MG: 4 TABLET, ORALLY DISINTEGRATING ORAL at 23:44

## 2024-10-31 NOTE — Clinical Note
Steph Maier was seen and treated in our emergency department on 10/31/2024.                Diagnosis:     Steph  .    She may return on this date: 11/02/2024         If you have any questions or concerns, please don't hesitate to call.      Luis Bello MD    ______________________________           _______________          _______________  Hospital Representative                              Date                                Time

## 2024-11-01 VITALS
BODY MASS INDEX: 33.82 KG/M2 | OXYGEN SATURATION: 98 % | HEART RATE: 80 BPM | SYSTOLIC BLOOD PRESSURE: 141 MMHG | HEIGHT: 65 IN | DIASTOLIC BLOOD PRESSURE: 86 MMHG | WEIGHT: 203 LBS | RESPIRATION RATE: 16 BRPM | TEMPERATURE: 96.3 F

## 2024-11-01 RX ORDER — ONDANSETRON 4 MG/1
4 TABLET, FILM COATED ORAL EVERY 6 HOURS
Qty: 12 TABLET | Refills: 0 | Status: SHIPPED | OUTPATIENT
Start: 2024-11-01

## 2024-11-01 NOTE — DISCHARGE INSTRUCTIONS
"Your imaging showed no injuries.     It did note an incidental finding of the head. The radiologist saw that there is \"New asymmetric enlargement of the right anterior horn lateral ventricle compared to remote prior study. This is of uncertain etiology and clinical significance. Consideration to nonemergent MRI brain is recommended for further evaluation\".  - I recommend discussing this with your family doctor or neurologist to determine need for further evaluation.     You will likely have worsening pain over the next 2-3 days. You can take tylenol and ibuprofen to help with this.    I have sent zofran to your pharmacy to help with any nausea you have.     Please read the attached information to monitor for concussion symptoms and management.     Please return to the ED with severe headache, vomiting, numbness/weakness, or other concerns.   "

## 2024-11-01 NOTE — ED PROVIDER NOTES
Time reflects when diagnosis was documented in both MDM as applicable and the Disposition within this note       Time User Action Codes Description Comment    11/1/2024  1:24 AM Yokubaitis, Luis Add [W19.XXXA] Fall, initial encounter     11/1/2024  1:24 AM Yokubaitis, Luis Add [S09.90XA] Closed head injury, initial encounter     11/1/2024  1:25 AM Yokubaitis, Luis Add [R93.0] Abnormal CT of the head     11/1/2024  1:25 AM Yokubaitis, Luis Add [R11.0] Nausea     11/1/2024  1:29 AM Yokubaitis, Luis Add [E55.9] Vitamin D deficiency     11/1/2024  1:29 AM Yokubaitis, Luis Remove [E55.9] Vitamin D deficiency           ED Disposition       ED Disposition   Discharge    Condition   Stable    Date/Time   Fri Nov 1, 2024  1:24 AM    Comment   Steph Maier discharge to home/self care.                   Assessment & Plan       Medical Decision Making  33-year-old female present emergency department due to a fall down multiple steps.  Patient has multiple areas of tenderness concerning potential polytrauma.  CT and x-rays obtained to evaluate for injuries and negative.  Patient treated symptomatically, discussed the potential for concussion given nausea.  Ambulatory in the ED, tolerating p.o. intake, appropriate for discharge with further home care recommendations, return precautions, and follow-up with primary care doctor for reevaluation.    Amount and/or Complexity of Data Reviewed  Radiology: ordered and independent interpretation performed.    Risk  Prescription drug management.        ED Course as of 11/03/24 1316   Thu Oct 31, 2024   2345 Patient actively on period.    Fri Nov 01, 2024   0035 CTH: New asymmetric enlargement of the right anterior horn lateral ventricle compared to remote prior study. This is of uncertain etiology and clinical significance. Consideration to nonemergent MRI brain is recommended for further evaluation.  Otherwise no acute intracranial abnormality.         Medications   ondansetron  "(ZOFRAN-ODT) dispersible tablet 4 mg (4 mg Oral Given 10/31/24 8015)       ED Risk Strat Scores                           SBIRT 20yo+      Flowsheet Row Most Recent Value   Initial Alcohol Screen: US AUDIT-C     1. How often do you have a drink containing alcohol? 0 Filed at: 10/31/2024 2319   2. How many drinks containing alcohol do you have on a typical day you are drinking?  0 Filed at: 10/31/2024 2319   3b. FEMALE Any Age, or MALE 65+: How often do you have 4 or more drinks on one occassion? 0 Filed at: 10/31/2024 2319   Audit-C Score 0 Filed at: 10/31/2024 2319   JOHN: How many times in the past year have you...    Used an illegal drug or used a prescription medication for non-medical reasons? Never Filed at: 10/31/2024 2319                            History of Present Illness       Chief Complaint   Patient presents with    Fall     Fell down the stairs about 2 hours ago when she slipped down carpeted stairs in her socks. Multiple areas of muscular pain and joint pain. Has also been dealing with a sinus infection and is not sure if that is causing her nausea. No thinners or asa.       Past Medical History:   Diagnosis Date    Deviated septum     Eczema     Recurrent sinus infections     Seizures (HCC)     febrile as a child \"grew out of them\"    Wears glasses       Past Surgical History:   Procedure Laterality Date    APPENDECTOMY      LYMPH NODE DISSECTION      \"in neck completely removed age 5\"    NOSE SURGERY  2017    [place] NASAL/SINUS NDSC W/TOTAL ETHOIDECTOMY N/A 2/28/2018    Procedure: PARTIAL INFERIOR TURBINATE REDUCTION;  Surgeon: Eros Gonzalez DO;  [facility];  Service: ENT    [place] SEPTOPLASTY/SUBMUCOUS RESECJ W/WO CARTILAGE GRF N/A 2/28/2018    Procedure: SEPTOPLASTY WITH REMOVAL OF RIGHT FLOOR SPUR;  Surgeon: Eros Gonzalez DO;  [facility];  Service: ENT    [place] STRTCTC CPTR ASSTD PX EXTRADURAL CRANIAL N/A 2/28/2018    Procedure: FUNCTIONAL ENDOSCOPIC SINUS SURGERY (FESS) IMAGED " GUIDED;  Surgeon: Eros Gonzalez DO;  [facility];  Service: ENT      Family History   Problem Relation Age of Onset    No Known Problems Mother     No Known Problems Father       Social History     Tobacco Use    Smoking status: Never    Smokeless tobacco: Never   Vaping Use    Vaping status: Never Used   Substance Use Topics    Alcohol use: Yes     Comment: social    Drug use: No      E-Cigarette/Vaping    E-Cigarette Use Never User       E-Cigarette/Vaping Substances    Nicotine No     THC No     CBD No     Flavoring No     Other No     Unknown No       I have reviewed and agree with the history as documented.     33-year-old female with no pertinent past medical history, not on blood thinners, presents emergency department due to fall down stairs.  Patient was at the top of a set of stairs when she fell, is not sure exactly what she struck on the way down but had pain in her left shoulder, low back, neck, and is not sure if she hit her head.  She notes that all happened so fast.  She otherwise denies vision changes, numbness, weakness, or other complaints.         Review of Systems   All other systems reviewed and are negative.          Objective       ED Triage Vitals [10/31/24 2317]   Temperature Pulse Blood Pressure Respirations SpO2 Patient Position - Orthostatic VS   (!) 96.3 °F (35.7 °C) 88 141/86 16 98 % Sitting      Temp Source Heart Rate Source BP Location FiO2 (%) Pain Score    Tympanic Monitor Left arm -- 6      Vitals      Date and Time Temp Pulse SpO2 Resp BP Pain Score FACES Pain Rating User   11/01/24 0130 -- 80 98 % -- -- -- -- TG   11/01/24 0115 -- 82 97 % -- -- -- -- TG   11/01/24 0100 -- 81 98 % -- -- -- -- TG   11/01/24 0045 -- 82 97 % -- -- -- -- TG   10/31/24 2317 96.3 °F (35.7 °C) 88 98 % 16 141/86 6 -- TG            Physical Exam  Constitutional:       General: She is not in acute distress.     Appearance: Normal appearance. She is not ill-appearing or toxic-appearing.   HENT:       Head: Normocephalic and atraumatic.      Mouth/Throat:      Mouth: Mucous membranes are moist.   Eyes:      Extraocular Movements: Extraocular movements intact.   Cardiovascular:      Rate and Rhythm: Normal rate and regular rhythm.   Pulmonary:      Effort: Pulmonary effort is normal.      Breath sounds: Normal breath sounds.   Abdominal:      Palpations: Abdomen is soft.   Musculoskeletal:         General: Tenderness (palpation midline c-spine, left shoulder diffusely, lumbar midline back. no stepoffs or deformities.) present.   Skin:     General: Skin is warm.   Neurological:      General: No focal deficit present.      Mental Status: She is alert.   Psychiatric:         Mood and Affect: Mood normal.         Results Reviewed       None            XR shoulder 2+ views LEFT   ED Interpretation by Luis Bello MD (11/01 0035)   No acute osseous abnormality      Final Interpretation by Chapin Hassan MD (11/01 0647)      No acute osseous abnormality.         Computerized Assisted Algorithm (CAA) may have been used to analyze all applicable images.         Workstation performed: [id number]         CT head without contrast   Final Interpretation by Ion Live DO (11/01 0030)   New asymmetric enlargement of the right anterior horn lateral ventricle compared to remote prior study. This is of uncertain etiology and clinical significance. Consideration to nonemergent MRI brain is recommended for further evaluation.   Otherwise no acute intracranial abnormality.                  Workstation performed: [id number]         CT cervical spine without contrast   Final Interpretation by Ion Live DO (11/01 0041)      No cervical spine fracture or traumatic malalignment.                  Workstation performed: [id number]         CT lumbar spine without contrast   Final Interpretation by Ion Live DO (11/01 0043)   No acute fracture or traumatic subluxation.      Workstation performed: [id number]              Procedures    ED Medication and Procedure Management   Prior to Admission Medications   Prescriptions Last Dose Informant Patient Reported? Taking?   EPINEPHrine (EPIPEN) 0.3 mg/0.3 mL SOAJ   Yes No   Sig: Inject 0.3 mg into a muscle once    Tri-Leta 0.18/0.215/0.25 MG-35 MCG per tablet   No No   Sig: TAKE 1 TABLET BY MOUTH DAILY   cyclobenzaprine (FLEXERIL) 10 mg tablet   No No   Sig: Take 1 tablet (10 mg total) by mouth daily at bedtime for 14 days   methylPREDNISolone 4 MG tablet therapy pack   No No   Sig: Use as directed on package      Facility-Administered Medications: None     Discharge Medication List as of 11/1/2024  1:29 AM        START taking these medications    Details   ondansetron (ZOFRAN) 4 mg tablet Take 1 tablet (4 mg total) by mouth every 6 (six) hours, Starting Fri 11/1/2024, Normal           CONTINUE these medications which have NOT CHANGED    Details   cyclobenzaprine (FLEXERIL) 10 mg tablet Take 1 tablet (10 mg total) by mouth daily at bedtime for 14 days, Starting Thu 2/1/2024, Until Thu 2/15/2024, Normal      EPINEPHrine (EPIPEN) 0.3 mg/0.3 mL SOAJ Inject 0.3 mg into a muscle once , Historical Med      methylPREDNISolone 4 MG tablet therapy pack Use as directed on package, Normal      Tri-Leta 0.18/0.215/0.25 MG-35 MCG per tablet TAKE 1 TABLET BY MOUTH DAILY, Starting Thu 3/14/2024, Normal             ED SEPSIS DOCUMENTATION   Time reflects when diagnosis was documented in both MDM as applicable and the Disposition within this note       Time User Action Codes Description Comment    11/1/2024  1:24 AM PriyaitisFernanday Add [W19.XXXA] Fall, initial encounter     11/1/2024  1:24 AM Yokubaitis Luis Add [S09.90XA] Closed head injury, initial encounter     11/1/2024  1:25 AM YokubaitisFernanday Add [R93.0] Abnormal CT of the head     11/1/2024  1:25 AM Yokubaitis Luis Add [R11.0] Nausea     11/1/2024  1:29 AM YokubaitisFernanday Add [E55.9] Vitamin D deficiency     11/1/2024  1:29 AM  Luis Bello Remove [E55.9] Vitamin D deficiency                  Luis Bello MD  11/03/24 [id number]

## 2025-01-10 NOTE — LETTER
September 11, 2023     Patient: Ruth Stockton   YOB: 1990   Date of Visit: 9/11/2023       To Whom It May Concern: It is my medical opinion that Leslie De La Torre may return to work on 09/13/2023 . If you have any questions or concerns, please don't hesitate to call.          Sincerely,        Oscar Espinoza PA-C    CC: No Recipients 4

## 2025-01-15 ENCOUNTER — OFFICE VISIT (OUTPATIENT)
Dept: URGENT CARE | Age: 35
End: 2025-01-15
Payer: COMMERCIAL

## 2025-01-15 VITALS
OXYGEN SATURATION: 99 % | HEIGHT: 65 IN | HEART RATE: 80 BPM | SYSTOLIC BLOOD PRESSURE: 112 MMHG | RESPIRATION RATE: 18 BRPM | BODY MASS INDEX: 31.99 KG/M2 | DIASTOLIC BLOOD PRESSURE: 72 MMHG | TEMPERATURE: 97.7 F | WEIGHT: 192 LBS

## 2025-01-15 DIAGNOSIS — L03.213 PERIORBITAL CELLULITIS OF RIGHT EYE: Primary | ICD-10-CM

## 2025-01-15 PROCEDURE — S9083 URGENT CARE CENTER GLOBAL: HCPCS

## 2025-01-15 PROCEDURE — G0382 LEV 3 HOSP TYPE B ED VISIT: HCPCS

## 2025-01-15 RX ORDER — OFLOXACIN 3 MG/ML
1 SOLUTION/ DROPS OPHTHALMIC 4 TIMES DAILY
Qty: 5 ML | Refills: 0 | Status: SHIPPED | OUTPATIENT
Start: 2025-01-15 | End: 2025-01-15

## 2025-01-15 RX ORDER — OFLOXACIN 3 MG/ML
1 SOLUTION/ DROPS OPHTHALMIC 4 TIMES DAILY
Qty: 5 ML | Refills: 0 | Status: SHIPPED | OUTPATIENT
Start: 2025-01-15 | End: 2025-01-22

## 2025-01-15 NOTE — PROGRESS NOTES
"  Assessment/Plan  Please begin oral and topical antibiotics as directed.   Apply warm moist compresses for 5-10 minutes 3-5 times per day.   Follow up with PCP if no relief within one week.   Go to ED for worsening symptoms, eye pain, vision changes, etc.     Periorbital cellulitis of right eye [L03.213]  1. Periorbital cellulitis of right eye  amoxicillin-clavulanate (AUGMENTIN) 875-125 mg per tablet    ofloxacin (OCUFLOX) 0.3 % ophthalmic solution    DISCONTINUED: amoxicillin-clavulanate (AUGMENTIN) 875-125 mg per tablet    DISCONTINUED: ofloxacin (OCUFLOX) 0.3 % ophthalmic solution        Patient Education     Cellulitis around the eye - Discharge instructions   The Basics   Written by the doctors and editors at [facility]   What are discharge instructions? -- Discharge instructions are information about how to take care of yourself or someone else after getting medical care for a health problem.  What is cellulitis? -- Cellulitis is a skin infection caused by bacteria (figure 1). Two types of cellulitis can affect the eye area:   \"Preseptal\" cellulitis - This affects only the eyelid.   \"Orbital\" cellulitis - This goes deeper into the fat and muscles around the eyeball. It is less common than preseptal cellulitis, but more serious.  In cellulitis, bacteria get into the skin or tissues around the eye. This can happen if a person has a sinus infection. It can also happen if there is an infection in another part of the face. Sometimes, bacteria get in through the skin after surgery or an injury near the eye.  Cellulitis is treated with antibiotics. Some people need to get antibiotics through an IV. (An IV is a thin tube that goes into a vein.) Then, they switch to antibiotic pills or liquid after a few days. Other people only need antibiotic pills or liquid. It is important to take all of your antibiotics, even if you start to feel better.  With cellulitis, just putting antibiotic ointment on the skin does not " work.  How do I care for myself at home? -- Ask the doctor or nurse what you should do when you go home. Make sure that you understand exactly what you need to do to care for yourself. Ask questions if there is anything you do not understand.  You should also:   Avoid squeezing, scratching, or rubbing the affected eye or area around the eye.   Keep the infected area clean and dry. You can gently wash the skin around your eye with soap and water or take a shower. Pat the area dry with a clean towel.   Wash your hands before and after you touch the infected area. However, someone else cannot catch your cellulitis.  When should I call the doctor? -- Call for advice if:   You have a fever of 100.4°F (38.0°C) or higher.   Your vision gets worse, or you start to see double.   You have new or worsening pain around your eye, especially pain when you move your eyes.   You have pus draining from your eye.   You become weak or confused.   The area becomes more red, swollen, or painful, or the redness or swelling spreads to a larger area.   The infected area is not better after 2 days of taking antibiotics.  All topics are updated as new evidence becomes available and our peer review process is complete.  This topic retrieved from [employer] on: Apr 11, 2024.  Topic 965730 Version 1.0  Release: 32.3.2 - C32.100  © 2024 UpToDate, Inc. and/or its affiliates. All rights reserved.  figure 1: Cellulitis around the eye     Cellulitis around the eye is an infection of theskin and soft tissues around the eye. These infections can cause redness, pain,and/or swelling.  Graphic 910935 Version 1.0  Consumer Information Use and Disclaimer   Disclaimer: This generalized information is a limited summary of diagnosis, treatment, and/or medication information. It is not meant to be comprehensive and should be used as a tool to help the user understand and/or assess potential diagnostic and treatment options. It does NOT include all information  about conditions, treatments, medications, side effects, or risks that may apply to a specific patient. It is not intended to be medical advice or a substitute for the medical advice, diagnosis, or treatment of a health care provider based on the health care provider's examination and assessment of a patient's specific and unique circumstances. Patients must speak with a health care provider for complete information about their health, medical questions, and treatment options, including any risks or benefits regarding use of medications. This information does not endorse any treatments or medications as safe, effective, or approved for treating a specific patient. UpToDate, Inc. and its affiliates disclaim any warranty or liability relating to this information or the use thereof.The use of this information is governed by the Terms of Use, available at https://www.[employer].com/en/know/clinical-effectiveness-terms. 2024© UpToDate, Inc. and its affiliates and/or licensors. All rights reserved.  Copyright   © 2024 UpToDate, Inc. and/or its affiliates. All rights reserved.            Subjective:     Patient ID: Steph Maier is a 34 y.o. female.      Reason For Visit / Chief Complaint  Chief Complaint   Patient presents with    Facial Swelling     Pt states she woke up this morning with swelling around right eye, eye is red and irritated. Denies pain or feeling of foreign object.          Eye Problem   The right eye is affected. This is a new problem. The current episode started today. The problem has been unchanged. There was no injury mechanism. There is No known exposure to pink eye. She Does not wear contacts. Associated symptoms include eye redness. Pertinent negatives include no blurred vision, eye discharge, double vision, fever, foreign body sensation, itching, nausea, photophobia, recent URI or vomiting. Associated symptoms comments: Tearing and redness of eye and upper and lower right eyelids. She has  "tried nothing for the symptoms. The treatment provided no relief.         Past Medical History:   Diagnosis Date    Deviated septum     Eczema     Recurrent sinus infections     Seizures (HCC)     febrile as a child \"grew out of them\"    Wears glasses        Past Surgical History:   Procedure Laterality Date    APPENDECTOMY      LYMPH NODE DISSECTION      \"in neck completely removed age 5\"    NOSE SURGERY  2017    IA NASAL/SINUS NDSC W/TOTAL ETHOIDECTOMY N/A 2/28/2018    Procedure: PARTIAL INFERIOR TURBINATE REDUCTION;  Surgeon: Eros Gonzalez DO;  Location: [facility];  Service: ENT    IA SEPTOPLASTY/SUBMUCOUS RESECJ W/WO CARTILAGE GRF N/A 2/28/2018    Procedure: SEPTOPLASTY WITH REMOVAL OF RIGHT FLOOR SPUR;  Surgeon: Eros Gonzalez DO;  Location: [facility];  Service: ENT    IA STRTCTC CPTR ASSTD PX EXTRADURAL CRANIAL N/A 2/28/2018    Procedure: FUNCTIONAL ENDOSCOPIC SINUS SURGERY (FESS) IMAGED GUIDED;  Surgeon: Eros Gonzalez DO;  Location[facility];  Service: ENT       Family History   Problem Relation Age of Onset    No Known Problems Mother     No Known Problems Father        Review of Systems   Constitutional:  Negative for fatigue and fever.   HENT:  Negative for congestion, ear discharge, ear pain, postnasal drip, rhinorrhea, sinus pressure, sinus pain, sneezing and sore throat.    Eyes:  Positive for redness. Negative for blurred vision, double vision, photophobia, pain, discharge, itching and visual disturbance.   Respiratory: Negative.  Negative for apnea, cough, choking, chest tightness, shortness of breath, wheezing and stridor.    Cardiovascular: Negative.  Negative for chest pain and palpitations.   Gastrointestinal: Negative.  Negative for diarrhea, nausea and vomiting.   Endocrine: Negative.  Negative for polydipsia, polyphagia and polyuria.   Genitourinary: Negative.  Negative for decreased urine volume and flank pain.   Musculoskeletal: Negative.  Negative for arthralgias, back pain, " "myalgias, neck pain and neck stiffness.   Skin: Negative.  Negative for color change and rash.   Allergic/Immunologic: Negative.  Negative for environmental allergies.   Neurological: Negative.  Negative for dizziness, facial asymmetry, light-headedness, numbness and headaches.   Hematological: Negative.  Negative for adenopathy.   Psychiatric/Behavioral: Negative.         Objective:    /72   Pulse 80   Temp 97.7 °F (36.5 °C)   Resp 18   Ht 5' 5\" (1.651 m)   Wt 87.1 kg (192 lb)   SpO2 99%   BMI 31.95 kg/m²     Physical Exam  Vitals and nursing note reviewed.   Constitutional:       General: She is not in acute distress.     Appearance: Normal appearance. She is not ill-appearing, toxic-appearing or diaphoretic.   HENT:      Head: Normocephalic and atraumatic.      Right Ear: Tympanic membrane, ear canal and external ear normal. There is no impacted cerumen.      Left Ear: Tympanic membrane, ear canal and external ear normal. There is no impacted cerumen.      Nose: Nose normal. No congestion or rhinorrhea.      Mouth/Throat:      Mouth: Mucous membranes are moist.   Eyes:      General: Lids are everted, no foreign bodies appreciated. Vision grossly intact. Gaze aligned appropriately. No allergic shiner, visual field deficit or scleral icterus.        Right eye: No foreign body, discharge or hordeolum.         Left eye: No foreign body, discharge or hordeolum.      Extraocular Movements: Extraocular movements intact.      Right eye: Normal extraocular motion and no nystagmus.      Left eye: Normal extraocular motion and no nystagmus.      Conjunctiva/sclera:      Right eye: Right conjunctiva is injected. No chemosis, exudate or hemorrhage.     Left eye: Left conjunctiva is not injected. No chemosis, exudate or hemorrhage.     Pupils: Pupils are equal, round, and reactive to light.      Comments: Mild injection of right eye and mild swelling/erythema of upper and lower eyelids of right eye. Mild TTP over " maxillary sinus.    Cardiovascular:      Rate and Rhythm: Normal rate and regular rhythm.      Pulses: Normal pulses.      Heart sounds: Normal heart sounds. No murmur heard.     No friction rub. No gallop.   Pulmonary:      Effort: Pulmonary effort is normal. No respiratory distress.      Breath sounds: Normal breath sounds. No stridor. No wheezing, rhonchi or rales.   Abdominal:      General: Bowel sounds are normal.      Palpations: Abdomen is soft.      Tenderness: There is no abdominal tenderness. There is no guarding or rebound.   Musculoskeletal:         General: Normal range of motion.      Cervical back: Normal range of motion and neck supple. No tenderness.   Skin:     General: Skin is warm and dry.      Capillary Refill: Capillary refill takes less than 2 seconds.   Neurological:      General: No focal deficit present.      Mental Status: She is alert and oriented to person, place, and time.      Cranial Nerves: No cranial nerve deficit.   Psychiatric:         Mood and Affect: Mood normal.         Behavior: Behavior normal.

## 2025-01-15 NOTE — PATIENT INSTRUCTIONS
Please begin oral and topical antibiotics as directed.   Apply warm moist compresses for 5-10 minutes 3-5 times per day.   Follow up with PCP if no relief within one week.   Go to ED for worsening symptoms, eye pain, vision changes, etc.

## 2025-05-01 ENCOUNTER — TELEPHONE (OUTPATIENT)
Dept: GYNECOLOGY | Facility: CLINIC | Age: 35
End: 2025-05-01

## 2025-05-01 ENCOUNTER — ANNUAL EXAM (OUTPATIENT)
Dept: GYNECOLOGY | Facility: CLINIC | Age: 35
End: 2025-05-01
Payer: COMMERCIAL

## 2025-05-01 VITALS
HEART RATE: 93 BPM | DIASTOLIC BLOOD PRESSURE: 84 MMHG | HEIGHT: 65 IN | SYSTOLIC BLOOD PRESSURE: 122 MMHG | BODY MASS INDEX: 33.85 KG/M2 | WEIGHT: 203.2 LBS

## 2025-05-01 DIAGNOSIS — Z01.419 ENCOUNTER FOR GYNECOLOGICAL EXAMINATION WITHOUT ABNORMAL FINDING: Primary | ICD-10-CM

## 2025-05-01 DIAGNOSIS — Z30.8 ENCOUNTER FOR OTHER CONTRACEPTIVE MANAGEMENT: ICD-10-CM

## 2025-05-01 DIAGNOSIS — Z11.3 SCREENING EXAMINATION FOR STD (SEXUALLY TRANSMITTED DISEASE): ICD-10-CM

## 2025-05-01 PROCEDURE — S0610 ANNUAL GYNECOLOGICAL EXAMINA: HCPCS | Performed by: OBSTETRICS & GYNECOLOGY

## 2025-05-01 PROCEDURE — 87591 N.GONORRHOEAE DNA AMP PROB: CPT | Performed by: OBSTETRICS & GYNECOLOGY

## 2025-05-01 PROCEDURE — G0145 SCR C/V CYTO,THINLAYER,RESCR: HCPCS | Performed by: OBSTETRICS & GYNECOLOGY

## 2025-05-01 PROCEDURE — 87491 CHLMYD TRACH DNA AMP PROBE: CPT | Performed by: OBSTETRICS & GYNECOLOGY

## 2025-05-01 NOTE — TELEPHONE ENCOUNTER
Steph Null will need cytotec called into the pharmacy for IUD (Mirena) scheduled end of May Pt. is having GC/CHL run off of her pap today and has info to call her insurance co.

## 2025-05-01 NOTE — TELEPHONE ENCOUNTER
Pt states she called her insurance and was advised that the Mirena/ procedure is 100% covered as it is used for bc purposes.

## 2025-05-01 NOTE — PROGRESS NOTES
"Name: Steph Maier      : 1990      MRN: [id number]  Encounter Provider: Dimitri Hernandez DO  Encounter Date: 2025   Encounter department: [facility]  :  Assessment & Plan  Encounter for gynecological examination without abnormal finding         Encounter for other contraceptive management             History of Present Illness   HPI  Steph Maier is a 34 y.o. female G0 new patientwho presents for GYN exam.  She is also interested in proceeding with Mirena IUD.  She has been on oral contraceptives for several years.  Discontinued this last month.  She denies any vaginal irritation, burning, discharge.  No urinary complaints.  Patient has had HPV vaccine.      Review of Systems   Constitutional: Negative.    HENT:  Negative for sore throat and trouble swallowing.    Gastrointestinal: Negative.    Genitourinary: Negative.      Past Medical History   Past Medical History:   Diagnosis Date    Deviated septum     Eczema     Recurrent sinus infections     Seizures (HCC)     febrile as a child \"grew out of them\"    Wears glasses      Past Surgical History:   Procedure Laterality Date    APPENDECTOMY      LYMPH NODE DISSECTION      \"in neck completely removed age 5\"    NOSE SURGERY  2017    IN NASAL/SINUS NDSC W/TOTAL ETHOIDECTOMY N/A 2018    Procedure: PARTIAL INFERIOR TURBINATE REDUCTION;  Surgeon: Eros Gonzalez DO;  [facility];  Service: ENT    IN SEPTOPLASTY/SUBMUCOUS RESECJ W/WO CARTILAGE GRF N/A 2018    Procedure: SEPTOPLASTY WITH REMOVAL OF RIGHT FLOOR SPUR;  Surgeon: Eros Gonzalez DO;  [facility];  Service: ENT    IN STRTCTC CPTR ASSTD PX EXTRADURAL CRANIAL N/A 2018    Procedure: FUNCTIONAL ENDOSCOPIC SINUS SURGERY (FESS) IMAGED GUIDED;  Surgeon: Eros Gonzalez DO;  [facility];  Service: ENT     Family History   Problem Relation Age of Onset    No Known Problems Mother     No Known Problems Father       " "reports that she has never smoked. She has never used smokeless tobacco. She reports that she does not currently use alcohol. She reports that she does not use drugs.  Current Outpatient Medications   Medication Instructions    cyclobenzaprine (FLEXERIL) 10 mg, Oral, Daily at bedtime    EPINEPHrine (EPIPEN) 0.3 mg, Once    methylPREDNISolone 4 MG tablet therapy pack Use as directed on package    ondansetron (ZOFRAN) 4 mg, Oral, Every 6 hours    Tri-Leta 0.18/0.215/0.25 MG-35 MCG per tablet 1 tablet, Oral, Daily     Allergies   Allergen Reactions    Shellfish-Derived Products - Food Allergy Shortness Of Breath     Muscle spasms      Current Outpatient Medications on File Prior to Visit   Medication Sig Dispense Refill    cyclobenzaprine (FLEXERIL) 10 mg tablet Take 1 tablet (10 mg total) by mouth daily at bedtime for 14 days 14 tablet 0    EPINEPHrine (EPIPEN) 0.3 mg/0.3 mL SOAJ Inject 0.3 mg into a muscle once  (Patient not taking: Reported on 5/1/2025)      methylPREDNISolone 4 MG tablet therapy pack Use as directed on package 21 tablet 0    ondansetron (ZOFRAN) 4 mg tablet Take 1 tablet (4 mg total) by mouth every 6 (six) hours 12 tablet 0    Tri-Leta 0.18/0.215/0.25 MG-35 MCG per tablet TAKE 1 TABLET BY MOUTH DAILY (Patient not taking: Reported on 5/1/2025) 84 tablet 3     No current facility-administered medications on file prior to visit.      Social History     Tobacco Use    Smoking status: Never    Smokeless tobacco: Never   Vaping Use    Vaping status: Never Used   Substance and Sexual Activity    Alcohol use: Not Currently     Comment: social    Drug use: Never    Sexual activity: Not Currently     Partners: Male        Objective   /84 (BP Location: Left arm, Patient Position: Sitting, Cuff Size: Large)   Pulse 93   Ht 5' 5\" (1.651 m)   Wt 92.2 kg (203 lb 3.2 oz)   LMP 04/24/2025   BMI 33.81 kg/m²      Physical Exam  Vitals reviewed.   Cardiovascular:      Rate and Rhythm: Normal rate and " regular rhythm.      Pulses: Normal pulses.      Heart sounds: Normal heart sounds.   Pulmonary:      Effort: Pulmonary effort is normal.      Breath sounds: Normal breath sounds.   Chest:   Breasts:     Right: No swelling, bleeding, inverted nipple, mass, nipple discharge, skin change or tenderness.      Left: No swelling, bleeding, inverted nipple, mass, nipple discharge, skin change or tenderness.   Abdominal:      General: There is no distension.      Palpations: Abdomen is soft. There is no mass.      Tenderness: There is no abdominal tenderness. There is no guarding or rebound.      Hernia: No hernia is present. There is no hernia in the left inguinal area or right inguinal area.   Genitourinary:     General: Normal vulva.      Labia:         Right: No rash, tenderness or lesion.         Left: No rash, tenderness or lesion.       Vagina: Normal.      Cervix: Normal.      Uterus: Normal.       Adnexa:         Right: No mass, tenderness or fullness.          Left: No mass, tenderness or fullness.     Musculoskeletal:      Cervical back: Normal range of motion and neck supple.   Lymphadenopathy:      Upper Body:      Right upper body: No supraclavicular, axillary or pectoral adenopathy.      Left upper body: No supraclavicular, axillary or pectoral adenopathy.      Lower Body: No right inguinal adenopathy. No left inguinal adenopathy.   Neurological:      Mental Status: She is alert.

## 2025-05-02 ENCOUNTER — PATIENT MESSAGE (OUTPATIENT)
Dept: GYNECOLOGY | Facility: CLINIC | Age: 35
End: 2025-05-02

## 2025-05-02 NOTE — TELEPHONE ENCOUNTER
Not sure why this was sent to me. Pt needs to be scheduled with pre procedure education and orders and Mirena needs to be ordered.

## 2025-05-02 NOTE — PATIENT COMMUNICATION
Spoke with patient, let her know no intercourse for 2 weeks prior, can take tylenol or motrin an hour prior to insertions, and to expect to give a urine sample to check for pregnancy. Patient verbalized unsderstanding.

## 2025-05-02 NOTE — TELEPHONE ENCOUNTER
Pt saw Dr. Hernandez for a yearly 5/1 and she discussed wanting an IUD. I left vm for pt to call back to discuss instrcutions

## 2025-05-05 LAB
C TRACH DNA SPEC QL NAA+PROBE: NEGATIVE
N GONORRHOEA DNA SPEC QL NAA+PROBE: NEGATIVE

## 2025-05-07 LAB
LAB AP GYN PRIMARY INTERPRETATION: NORMAL
Lab: NORMAL

## 2025-05-30 ENCOUNTER — PROCEDURE VISIT (OUTPATIENT)
Dept: GYNECOLOGY | Facility: CLINIC | Age: 35
End: 2025-05-30
Payer: COMMERCIAL

## 2025-05-30 DIAGNOSIS — Z30.430 ENCOUNTER FOR IUD INSERTION: Primary | ICD-10-CM

## 2025-05-30 DIAGNOSIS — Z01.818 PREPROCEDURAL EXAMINATION: ICD-10-CM

## 2025-05-30 LAB — SL AMB POCT URINE HCG: NORMAL

## 2025-05-30 PROCEDURE — 58300 INSERT INTRAUTERINE DEVICE: CPT | Performed by: OBSTETRICS & GYNECOLOGY

## 2025-05-30 PROCEDURE — 81025 URINE PREGNANCY TEST: CPT | Performed by: OBSTETRICS & GYNECOLOGY

## 2025-05-30 NOTE — PROGRESS NOTES
IUD Procedure    Date/Time: 5/30/2025 11:35 AM    Performed by: SHELDON Weeks  Authorized by: SHELDON Weeks    Other Assisting Provider: No    Verbal consent obtained?: Yes    Written consent obtained?: Yes    Risks and benefits: Risks, benefits and alternatives were discussed (infection, pain, bleeding, injury to surrounding structures, uterine perforation, IUD migration requiring surgery, expulsion, failure to insert, ectopic pregnancy)    Time Out:     Time out performed at:  5/30/2025 11:35 AM  Patient states understanding of procedure being performed: Yes    Patient's understanding of procedure matches consent: Yes    Procedure consent matches procedure scheduled: Yes    Relevant documents present and verified: Yes    Test results available and properly labeled: Yes    Required items: Required blood products, implants, devices and special equipment available    Patient identity confirmed:  Verbally with patient  Select procedure: IUD insertion    IUD Insertion:     Pelvic exam performed: yes      Negative GC/chlamydia test: yes      Negative urine pregnancy test: yes      Cervix cleaned and prepped: yes (with Hibiclens X2)      Speculum placed in vagina: yes      Tenaculum applied to cervix: yes      IUD inserted with no complications: yes      Strings trimmed: yes (2 cms)      Uterus sounded: yes      Uterus sound depth (cm):  9    IUD type:  Levonorgestrel 20 MCG/DAY  Post-procedure:     Patient tolerated procedure well: yes      Patient will follow up after next period: yes    Insertion Comments:      Post procedure instructions given. Pt discharged in stable condition.   Will RTO in 1 month for IUD check.

## 2025-07-07 ENCOUNTER — OFFICE VISIT (OUTPATIENT)
Dept: GYNECOLOGY | Facility: CLINIC | Age: 35
End: 2025-07-07
Payer: COMMERCIAL

## 2025-07-07 VITALS — SYSTOLIC BLOOD PRESSURE: 116 MMHG | BODY MASS INDEX: 33.78 KG/M2 | DIASTOLIC BLOOD PRESSURE: 64 MMHG | WEIGHT: 203 LBS

## 2025-07-07 DIAGNOSIS — Z30.431 IUD CHECK UP: Primary | ICD-10-CM

## 2025-07-07 PROCEDURE — 99212 OFFICE O/P EST SF 10 MIN: CPT | Performed by: OBSTETRICS & GYNECOLOGY

## 2025-07-07 NOTE — PROGRESS NOTES
Name: Steph Maier      : 1990      MRN: [id number]  Encounter Provider: SHELDON Fitzgerald  Encounter Date: 2025   Encounter department: [facility]  :  Assessment & Plan  IUD check up  Pt likes IUD and would like to continue. Will RTO for annual exam or with any concerns.            History of Present Illness   Pt presents for 1 month IUD check.  Mirena IUD inserted 25  Bleeding -  had 2 weeks of spotting, not bothersome.   Sexually active  UTD with pap          Review of Systems   Constitutional: Negative.    HENT: Negative.     Respiratory: Negative.     Cardiovascular: Negative.    Gastrointestinal: Negative.    Endocrine: Negative.    Genitourinary:  Negative for difficulty urinating, dyspareunia, dysuria, frequency, menstrual problem, pelvic pain, urgency, vaginal bleeding, vaginal discharge and vaginal pain.   Musculoskeletal: Negative.    Skin: Negative.    Neurological: Negative.    Psychiatric/Behavioral: Negative.            Objective   /64 (BP Location: Right arm, Patient Position: Sitting, Cuff Size: Large)   Wt 92.1 kg (203 lb)   BMI 33.78 kg/m²      Physical Exam  Vitals and nursing note reviewed.   Constitutional:       Appearance: Normal appearance.   Pulmonary:      Effort: Pulmonary effort is normal.   Genitourinary:     General: Normal vulva.      Exam position: Supine.      Pubic Area: No rash.       Labia:         Right: No rash, tenderness, lesion or injury.         Left: No rash, tenderness, lesion or injury.       Urethra: No urethral pain, urethral swelling or urethral lesion.      Vagina: No signs of injury and foreign body. No vaginal discharge, erythema, tenderness, bleeding or lesions.      Cervix: No cervical motion tenderness, discharge, friability, lesion, erythema, cervical bleeding or eversion.      Uterus: Not deviated, not enlarged, not fixed, not tender and no uterine prolapse.       Adnexa:         Right:  No mass, tenderness or fullness.          Left: No mass, tenderness or fullness.        Comments: IUD strings noted 2 cms from os    Musculoskeletal:         General: Normal range of motion.      Cervical back: Normal range of motion.     Skin:     General: Skin is warm and dry.     Neurological:      Mental Status: She is alert and oriented to person, place, and time.     Psychiatric:         Mood and Affect: Mood normal.         Behavior: Behavior normal.         Thought Content: Thought content normal.         Judgment: Judgment normal.

## (undated) DEVICE — NEEDLE 25G X 1 1/2

## (undated) DEVICE — 2000CC GUARDIAN II: Brand: GUARDIAN

## (undated) DEVICE — SPECIMEN CONTAINER STERILE PEEL PACK

## (undated) DEVICE — STERILE NASAL PACK: Brand: CARDINAL HEALTH

## (undated) DEVICE — INSTRUMENT TRACKER 9733533XOM ENT 1PK

## (undated) DEVICE — TUBING 1895522 5PK STRAIGHTSHOT TO XPS: Brand: STRAIGHTSHOT®

## (undated) DEVICE — PATIENT TRACKER 9734887XOM NON-INVASIVE

## (undated) DEVICE — GAUZE SPONGES,USP TYPE VII GAUZE, 12 PLY: Brand: CURITY

## (undated) DEVICE — SUT CHROMIC 4-0 PS-4 18 IN 1643G

## (undated) DEVICE — SCD SEQUENTIAL COMPRESSION COMFORT SLEEVE MEDIUM KNEE LENGTH: Brand: KENDALL SCD

## (undated) DEVICE — TUBING SUCTION 5MM X 12 FT

## (undated) DEVICE — SHEATH 1912000 5PK 4MM/0DEG STORZ XOMED: Brand: ENDO-SCRUB®

## (undated) DEVICE — GLOVE SRG BIOGEL ECLIPSE 7.5

## (undated) DEVICE — SUCTION BOVIE ENT

## (undated) DEVICE — BUTTON SWITCH PENCIL HOLSTER: Brand: VALLEYLAB

## (undated) DEVICE — BLADE 1884080EM TRICUT 4MMX13CM M4 ROHS: Brand: FUSION®

## (undated) DEVICE — SPLINT 1524055 DOYLE II AIRWAY SET: Brand: DOYLE II ™

## (undated) DEVICE — SPECIMEN SOCK - SHORT: Brand: MEDI-VAC

## (undated) DEVICE — SYRINGE 20ML LL

## (undated) DEVICE — IV CATH 14 G SAFETY

## (undated) DEVICE — INSULATED NEEDLE ELECTRODE: Brand: EDGE

## (undated) DEVICE — ANTI-FOG SOLUTION WITH FOAM PAD: Brand: DEVON

## (undated) DEVICE — SYRINGE 10ML LL CONTROL TOP

## (undated) DEVICE — INTENDED FOR TISSUE SEPARATION, AND OTHER PROCEDURES THAT REQUIRE A SHARP SURGICAL BLADE TO PUNCTURE OR CUT.: Brand: BARD-PARKER ® CARBON RIB-BACK BLADES

## (undated) DEVICE — SHEATH 1912010 5PK 4MM/30DEG STORZ XOMED: Brand: ENDO-SCRUB®

## (undated) DEVICE — NEEDLE 18 G X 1 1/2

## (undated) DEVICE — NEURO PATTIES 1/2 X 3